# Patient Record
Sex: FEMALE | Race: WHITE | NOT HISPANIC OR LATINO | URBAN - METROPOLITAN AREA
[De-identification: names, ages, dates, MRNs, and addresses within clinical notes are randomized per-mention and may not be internally consistent; named-entity substitution may affect disease eponyms.]

---

## 2017-07-13 ENCOUNTER — INPATIENT (INPATIENT)
Facility: HOSPITAL | Age: 22
LOS: 1 days | Discharge: HOME | DRG: 770 | End: 2017-07-15
Attending: OBSTETRICS & GYNECOLOGY | Admitting: OBSTETRICS & GYNECOLOGY
Payer: COMMERCIAL

## 2017-07-13 VITALS
OXYGEN SATURATION: 100 % | RESPIRATION RATE: 18 BRPM | DIASTOLIC BLOOD PRESSURE: 83 MMHG | TEMPERATURE: 98 F | SYSTOLIC BLOOD PRESSURE: 118 MMHG | HEART RATE: 92 BPM

## 2017-07-13 LAB
ALBUMIN SERPL ELPH-MCNC: 4 G/DL — SIGNIFICANT CHANGE UP (ref 3.3–5)
ALP SERPL-CCNC: 34 U/L — LOW (ref 40–120)
ALT FLD-CCNC: 10 U/L RC — SIGNIFICANT CHANGE UP (ref 10–45)
ANION GAP SERPL CALC-SCNC: 13 MMOL/L — SIGNIFICANT CHANGE UP (ref 5–17)
AST SERPL-CCNC: 23 U/L — SIGNIFICANT CHANGE UP (ref 10–40)
BILIRUB SERPL-MCNC: 0.4 MG/DL — SIGNIFICANT CHANGE UP (ref 0.2–1.2)
BUN SERPL-MCNC: 10 MG/DL — SIGNIFICANT CHANGE UP (ref 7–23)
CALCIUM SERPL-MCNC: 8.5 MG/DL — SIGNIFICANT CHANGE UP (ref 8.4–10.5)
CHLORIDE SERPL-SCNC: 105 MMOL/L — SIGNIFICANT CHANGE UP (ref 96–108)
CO2 SERPL-SCNC: 20 MMOL/L — LOW (ref 22–31)
CREAT SERPL-MCNC: 0.51 MG/DL — SIGNIFICANT CHANGE UP (ref 0.5–1.3)
GLUCOSE SERPL-MCNC: 112 MG/DL — HIGH (ref 70–99)
HCG SERPL-ACNC: 2783 MIU/ML — SIGNIFICANT CHANGE UP
HCT VFR BLD CALC: 19.4 % — CRITICAL LOW (ref 34.5–45)
HGB BLD-MCNC: 6.6 G/DL — CRITICAL LOW (ref 11.5–15.5)
MCHC RBC-ENTMCNC: 30.6 PG — SIGNIFICANT CHANGE UP (ref 27–34)
MCHC RBC-ENTMCNC: 33.9 GM/DL — SIGNIFICANT CHANGE UP (ref 32–36)
MCV RBC AUTO: 90.4 FL — SIGNIFICANT CHANGE UP (ref 80–100)
PLATELET # BLD AUTO: 276 K/UL — SIGNIFICANT CHANGE UP (ref 150–400)
POTASSIUM SERPL-MCNC: 4.4 MMOL/L — SIGNIFICANT CHANGE UP (ref 3.5–5.3)
POTASSIUM SERPL-SCNC: 4.4 MMOL/L — SIGNIFICANT CHANGE UP (ref 3.5–5.3)
PROT SERPL-MCNC: 6.2 G/DL — SIGNIFICANT CHANGE UP (ref 6–8.3)
RBC # BLD: 2.14 M/UL — LOW (ref 3.8–5.2)
RBC # FLD: 11.6 % — SIGNIFICANT CHANGE UP (ref 10.3–14.5)
SODIUM SERPL-SCNC: 138 MMOL/L — SIGNIFICANT CHANGE UP (ref 135–145)
WBC # BLD: 7.1 K/UL — SIGNIFICANT CHANGE UP (ref 3.8–10.5)
WBC # FLD AUTO: 7.1 K/UL — SIGNIFICANT CHANGE UP (ref 3.8–10.5)

## 2017-07-13 PROCEDURE — 99285 EMERGENCY DEPT VISIT HI MDM: CPT | Mod: 25

## 2017-07-13 PROCEDURE — 93010 ELECTROCARDIOGRAM REPORT: CPT

## 2017-07-13 NOTE — ED ADULT NURSE NOTE - CHPI ED SYMPTOMS NEG
no dysuria/no vomiting/no burning urination/no chills/no abdominal distension/no blood in stool/no nausea/no hematuria/no fever/no diarrhea

## 2017-07-13 NOTE — ED PROVIDER NOTE - MEDICAL DECISION MAKING DETAILS
22F s/p elective TOP 4 weeks ago, presenting with increased vaginal bleeding and cramping; moderate bleeding with clots; plan to check cbc, cmp, type, hcg and obtain tv us to r/o retained poc.

## 2017-07-13 NOTE — ED ADULT NURSE NOTE - OBJECTIVE STATEMENT
Patient 23 yo F arrived ambulatory from triage. C/o of vaginal bleed. Pt reports having   an  procedure Elmer 17 and began having bleeding/ clots and low abd cramping. Patient denies Dizziness weakness at this time. No CP or SOB. No burning urination or  distress. No acute distress noted. IV placed. Labs drawn and sent. MD at bedside.

## 2017-07-13 NOTE — ED PROVIDER NOTE - OBJECTIVE STATEMENT
23 y/o female with pmhx of anxiety presents with c/o thick vaginal bleeding with clotting, weakness, and cramping. Pt had a termination surgery on June 17, and has had these symptoms since then. Denies fever, chills, vaginal discharge, or any other complaints.     OB/GYN  Dr. Garcia

## 2017-07-13 NOTE — ED PROVIDER NOTE - PROGRESS NOTE DETAILS
TV us shows signs of retained POC.  Hgb 6.6, transfusion with 2UPRBC ordered.  OBGYN consulted, to see patient.  -Braulio Awaiting OBGYN consultation, resident re-paged, 2 cases in OR, will come down as soon as possible; will continue to monitor. Malinda

## 2017-07-13 NOTE — ED ADULT TRIAGE NOTE - ARRIVAL INFO ADDITIONAL COMMENTS
Pt had   and has had continued cramping and ABD pain since.  Pt also having continued bleeding since procedure.

## 2017-07-14 ENCOUNTER — TRANSCRIPTION ENCOUNTER (OUTPATIENT)
Age: 22
End: 2017-07-14

## 2017-07-14 ENCOUNTER — RESULT REVIEW (OUTPATIENT)
Age: 22
End: 2017-07-14

## 2017-07-14 DIAGNOSIS — Z98.890 OTHER SPECIFIED POSTPROCEDURAL STATES: Chronic | ICD-10-CM

## 2017-07-14 DIAGNOSIS — O03.4 INCOMPLETE SPONTANEOUS ABORTION WITHOUT COMPLICATION: ICD-10-CM

## 2017-07-14 LAB
APPEARANCE UR: ABNORMAL
BASOPHILS # BLD AUTO: 0 K/UL — SIGNIFICANT CHANGE UP (ref 0–0.2)
BASOPHILS # BLD AUTO: 0.02 K/UL — SIGNIFICANT CHANGE UP (ref 0–0.2)
BASOPHILS NFR BLD AUTO: 0.3 % — SIGNIFICANT CHANGE UP (ref 0–2)
BASOPHILS NFR BLD AUTO: 0.4 % — SIGNIFICANT CHANGE UP (ref 0–2)
BILIRUB UR-MCNC: NEGATIVE — SIGNIFICANT CHANGE UP
BLD GP AB SCN SERPL QL: NEGATIVE — SIGNIFICANT CHANGE UP
COLOR SPEC: ABNORMAL
DIFF PNL FLD: ABNORMAL
EOSINOPHIL # BLD AUTO: 0 K/UL — SIGNIFICANT CHANGE UP (ref 0–0.5)
EOSINOPHIL # BLD AUTO: 0.03 K/UL — SIGNIFICANT CHANGE UP (ref 0–0.5)
EOSINOPHIL NFR BLD AUTO: 0.5 % — SIGNIFICANT CHANGE UP (ref 0–6)
EOSINOPHIL NFR BLD AUTO: 0.5 % — SIGNIFICANT CHANGE UP (ref 0–6)
GLUCOSE UR QL: 100
HCT VFR BLD CALC: 23.9 % — LOW (ref 34.5–45)
HGB BLD-MCNC: 7.9 G/DL — LOW (ref 11.5–15.5)
IMM GRANULOCYTES NFR BLD AUTO: 0.2 % — SIGNIFICANT CHANGE UP (ref 0–1.5)
KETONES UR-MCNC: ABNORMAL
LEUKOCYTE ESTERASE UR-ACNC: ABNORMAL
LYMPHOCYTES # BLD AUTO: 2.23 K/UL — SIGNIFICANT CHANGE UP (ref 1–3.3)
LYMPHOCYTES # BLD AUTO: 2.8 K/UL — SIGNIFICANT CHANGE UP (ref 1–3.3)
LYMPHOCYTES # BLD AUTO: 35.7 % — SIGNIFICANT CHANGE UP (ref 13–44)
LYMPHOCYTES # BLD AUTO: 39.1 % — SIGNIFICANT CHANGE UP (ref 13–44)
MCHC RBC-ENTMCNC: 27.7 PG — SIGNIFICANT CHANGE UP (ref 27–34)
MCHC RBC-ENTMCNC: 33.1 GM/DL — SIGNIFICANT CHANGE UP (ref 32–36)
MCV RBC AUTO: 83.9 FL — SIGNIFICANT CHANGE UP (ref 80–100)
MONOCYTES # BLD AUTO: 0.4 K/UL — SIGNIFICANT CHANGE UP (ref 0–0.9)
MONOCYTES # BLD AUTO: 0.42 K/UL — SIGNIFICANT CHANGE UP (ref 0–0.9)
MONOCYTES NFR BLD AUTO: 5.7 % — SIGNIFICANT CHANGE UP (ref 2–14)
MONOCYTES NFR BLD AUTO: 6.7 % — SIGNIFICANT CHANGE UP (ref 2–14)
NEUTROPHILS # BLD AUTO: 3.54 K/UL — SIGNIFICANT CHANGE UP (ref 1.8–7.4)
NEUTROPHILS # BLD AUTO: 3.9 K/UL — SIGNIFICANT CHANGE UP (ref 1.8–7.4)
NEUTROPHILS NFR BLD AUTO: 54.4 % — SIGNIFICANT CHANGE UP (ref 43–77)
NEUTROPHILS NFR BLD AUTO: 56.6 % — SIGNIFICANT CHANGE UP (ref 43–77)
NITRITE UR-MCNC: NEGATIVE — SIGNIFICANT CHANGE UP
PH UR: 8 — SIGNIFICANT CHANGE UP (ref 5–8)
PLAT MORPH BLD: NORMAL — SIGNIFICANT CHANGE UP
PLATELET # BLD AUTO: 243 K/UL — SIGNIFICANT CHANGE UP (ref 150–400)
PROT UR-MCNC: >600 MG/DL
RBC # BLD: 2.85 M/UL — LOW (ref 3.8–5.2)
RBC # FLD: 13.6 % — SIGNIFICANT CHANGE UP (ref 10.3–14.5)
RBC BLD AUTO: SIGNIFICANT CHANGE UP
RBC CASTS # UR COMP ASSIST: >50 /HPF (ref 0–2)
RH IG SCN BLD-IMP: POSITIVE — SIGNIFICANT CHANGE UP
SP GR SPEC: 1.03 — HIGH (ref 1.01–1.02)
UROBILINOGEN FLD QL: NEGATIVE — SIGNIFICANT CHANGE UP
WBC # BLD: 6.25 K/UL — SIGNIFICANT CHANGE UP (ref 3.8–10.5)
WBC # FLD AUTO: 6.25 K/UL — SIGNIFICANT CHANGE UP (ref 3.8–10.5)
WBC UR QL: ABNORMAL /HPF (ref 0–5)

## 2017-07-14 PROCEDURE — 76830 TRANSVAGINAL US NON-OB: CPT | Mod: 26

## 2017-07-14 PROCEDURE — 88305 TISSUE EXAM BY PATHOLOGIST: CPT | Mod: 26

## 2017-07-14 RX ORDER — SODIUM CHLORIDE 9 MG/ML
1000 INJECTION, SOLUTION INTRAVENOUS
Qty: 0 | Refills: 0 | Status: DISCONTINUED | OUTPATIENT
Start: 2017-07-14 | End: 2017-07-15

## 2017-07-14 RX ORDER — IBUPROFEN 200 MG
600 TABLET ORAL EVERY 6 HOURS
Qty: 0 | Refills: 0 | Status: DISCONTINUED | OUTPATIENT
Start: 2017-07-14 | End: 2017-07-15

## 2017-07-14 RX ORDER — CEFOTETAN DISODIUM 1 G
2 VIAL (EA) INJECTION ONCE
Qty: 0 | Refills: 0 | Status: COMPLETED | OUTPATIENT
Start: 2017-07-14 | End: 2017-07-14

## 2017-07-14 RX ORDER — HYDROMORPHONE HYDROCHLORIDE 2 MG/ML
0.5 INJECTION INTRAMUSCULAR; INTRAVENOUS; SUBCUTANEOUS
Qty: 0 | Refills: 0 | Status: DISCONTINUED | OUTPATIENT
Start: 2017-07-14 | End: 2017-07-14

## 2017-07-14 RX ORDER — ACETAMINOPHEN 500 MG
1000 TABLET ORAL ONCE
Qty: 0 | Refills: 0 | Status: DISCONTINUED | OUTPATIENT
Start: 2017-07-14 | End: 2017-07-15

## 2017-07-14 RX ORDER — ONDANSETRON 8 MG/1
8 TABLET, FILM COATED ORAL ONCE
Qty: 0 | Refills: 0 | Status: DISCONTINUED | OUTPATIENT
Start: 2017-07-14 | End: 2017-07-14

## 2017-07-14 RX ORDER — CLONAZEPAM 1 MG
0.5 TABLET ORAL DAILY
Qty: 0 | Refills: 0 | Status: DISCONTINUED | OUTPATIENT
Start: 2017-07-14 | End: 2017-07-15

## 2017-07-14 RX ORDER — OXYCODONE HYDROCHLORIDE 5 MG/1
5 TABLET ORAL EVERY 4 HOURS
Qty: 0 | Refills: 0 | Status: DISCONTINUED | OUTPATIENT
Start: 2017-07-14 | End: 2017-07-15

## 2017-07-14 RX ADMIN — HYDROMORPHONE HYDROCHLORIDE 0.5 MILLIGRAM(S): 2 INJECTION INTRAMUSCULAR; INTRAVENOUS; SUBCUTANEOUS at 19:20

## 2017-07-14 RX ADMIN — Medication 0.5 MILLIGRAM(S): at 21:11

## 2017-07-14 RX ADMIN — Medication 100 GRAM(S): at 04:09

## 2017-07-14 RX ADMIN — SODIUM CHLORIDE 125 MILLILITER(S): 9 INJECTION, SOLUTION INTRAVENOUS at 05:14

## 2017-07-14 RX ADMIN — Medication 110 MILLIGRAM(S): at 05:06

## 2017-07-14 RX ADMIN — HYDROMORPHONE HYDROCHLORIDE 0.5 MILLIGRAM(S): 2 INJECTION INTRAMUSCULAR; INTRAVENOUS; SUBCUTANEOUS at 19:30

## 2017-07-14 RX ADMIN — SODIUM CHLORIDE 125 MILLILITER(S): 9 INJECTION, SOLUTION INTRAVENOUS at 19:09

## 2017-07-14 NOTE — H&P ADULT - REASON FOR ADMISSION
Lightheadedness and vaginal bleeding/symptomatic anemia secondary to retained products of conception

## 2017-07-14 NOTE — H&P ADULT - PROBLEM SELECTOR PLAN 1
Neuro: c/w oral analgesia as needed  CV: hemodynamically stable  Pulm: saturating well on room air, encourage incentive spirometry and ambulation  GI: NPO  : voiding adequately.  LR@125.  No active vaginal bleeding. DVC for retained products of conception  Heme: Continue with 2 U PRBC transfusion.  Repeat CBC 4 hours after completion.  Ambulation and SCDs for DVT ppx    dw Dr Bustos

## 2017-07-14 NOTE — DISCHARGE NOTE ADULT - CARE PLAN
Principal Discharge DX:	Retained products of conception following   Goal:	postoperative wellness  Instructions for follow-up, activity and diet:	Advance diet and activity as tolerated. F/u GYN as scheduled. Call office with any concerns for complaints.

## 2017-07-14 NOTE — DISCHARGE NOTE ADULT - CARE PROVIDER_API CALL
Megan Bustos), Obstetrics and Gynecology  [address] [place] [address]  Phone: [phone]  Fax: [phone]

## 2017-07-14 NOTE — DISCHARGE NOTE ADULT - HOSPITAL COURSE
21 yo a/w symptomatic anemia found to have retained POC s/p TOP. Pt was given 2u pRBC before procedure. D&C performed on 7/14. Patient did well postoperatively. Tolerated regular diet and ivory d/c'ed. Patient was discharged on POD# ____ in stable condition. At time of discharge, ambulating, tolerating PO and voiding. Plan for f/u with GYN as outpatient. 21 yo a/w symptomatic anemia found to have retained POC s/p TOP. Pt was given 2u pRBC before procedure. D&C performed on 7/14. Patient did well postoperatively. Tolerated regular diet and ivory d/c'ed. Patient was discharged on POD# 1 (7/15/17) in stable condition. At time of discharge, ambulating, tolerating PO and voiding. Plan for f/u with GYN as outpatient.

## 2017-07-14 NOTE — H&P ADULT - NSHPPHYSICALEXAM_GEN_ALL_CORE
Constitutional: alert and oriented x 3, NAD    Respiratory: clear    Cardiovascular: regular rate and rhythm    Gastrointestinal: soft, non tender, + bowel sounds. No hepatosplenomegaly, no palpable masses    Genitourinary: NEFG  Cervix: closed/ long, no CMT, 5 cc blood in vault, no active bleeding  Uterus: normal size, non tender  Adnexa: non tender, no palpable masses

## 2017-07-14 NOTE — H&P ADULT - ATTENDING COMMENTS
Pt seen and examined. Agree with resident note. R / B / A of procedure  ( D and C for retained produts) disc with the patient. Pt expressed understanding and accepts.

## 2017-07-14 NOTE — H&P ADULT - HISTORY OF PRESENT ILLNESS
22 year old , LMP , presents with lightheadedness and SOB for one week in the context of heavy vaginal bleeding s/p ETOP with D+C on .  Patient underwent D+C for termination of pregnancy at an outside facility on 17.  Patient followed up there one week later with continued bleeding and was treated with Misprostol.  Since that time, patient has had heavy vaginal bleeding, intermittently passing clots.  For the past few days, patient has had lightheadedness and SOB.  Patient denies CP, palpitations, fevers, chills, nausea, vomiting, and changes in bowel habits.  Patient denies history of heavy bleeding.    In the ED, patient was hemodynamically stable and 2U PRBC transfusion was initiated for symptomatic anemia.    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

## 2017-07-14 NOTE — H&P ADULT - ASSESSMENT
22 year old , LMP , with symptomatic anemia secondary to vaginal bleeding from retained products of conception.  Patient is hemodynamically stable and currently without active vaginal bleeding.

## 2017-07-14 NOTE — DISCHARGE NOTE ADULT - PATIENT PORTAL LINK FT
“You can access the [facility] Patient Portal, offered by [facility], by registering with the following website: [email or web address]”

## 2017-07-14 NOTE — DISCHARGE NOTE ADULT - PLAN OF CARE
postoperative wellness Advance diet and activity as tolerated. F/u GYN as scheduled. Call office with any concerns for complaints.

## 2017-07-15 VITALS
OXYGEN SATURATION: 99 % | TEMPERATURE: 99 F | DIASTOLIC BLOOD PRESSURE: 69 MMHG | RESPIRATION RATE: 18 BRPM | HEART RATE: 62 BPM | SYSTOLIC BLOOD PRESSURE: 100 MMHG

## 2017-07-15 LAB
HCT VFR BLD CALC: 24.2 % — LOW (ref 34.5–45)
HGB BLD-MCNC: 8.6 G/DL — LOW (ref 11.5–15.5)
MCHC RBC-ENTMCNC: 31.2 PG — SIGNIFICANT CHANGE UP (ref 27–34)
MCHC RBC-ENTMCNC: 35.4 GM/DL — SIGNIFICANT CHANGE UP (ref 32–36)
MCV RBC AUTO: 88.3 FL — SIGNIFICANT CHANGE UP (ref 80–100)
PLATELET # BLD AUTO: 218 K/UL — SIGNIFICANT CHANGE UP (ref 150–400)
RBC # BLD: 2.74 M/UL — LOW (ref 3.8–5.2)
RBC # FLD: 12 % — SIGNIFICANT CHANGE UP (ref 10.3–14.5)
WBC # BLD: 6.2 K/UL — SIGNIFICANT CHANGE UP (ref 3.8–10.5)
WBC # FLD AUTO: 6.2 K/UL — SIGNIFICANT CHANGE UP (ref 3.8–10.5)

## 2017-07-15 PROCEDURE — 93005 ELECTROCARDIOGRAM TRACING: CPT

## 2017-07-15 PROCEDURE — 99285 EMERGENCY DEPT VISIT HI MDM: CPT | Mod: 25

## 2017-07-15 PROCEDURE — 88305 TISSUE EXAM BY PATHOLOGIST: CPT

## 2017-07-15 PROCEDURE — 86900 BLOOD TYPING SEROLOGIC ABO: CPT

## 2017-07-15 PROCEDURE — 81001 URINALYSIS AUTO W/SCOPE: CPT

## 2017-07-15 PROCEDURE — 84702 CHORIONIC GONADOTROPIN TEST: CPT

## 2017-07-15 PROCEDURE — 86923 COMPATIBILITY TEST ELECTRIC: CPT

## 2017-07-15 PROCEDURE — 85027 COMPLETE CBC AUTOMATED: CPT

## 2017-07-15 PROCEDURE — 36430 TRANSFUSION BLD/BLD COMPNT: CPT

## 2017-07-15 PROCEDURE — P9016: CPT

## 2017-07-15 PROCEDURE — 76830 TRANSVAGINAL US NON-OB: CPT

## 2017-07-15 PROCEDURE — 86850 RBC ANTIBODY SCREEN: CPT

## 2017-07-15 PROCEDURE — P9040: CPT

## 2017-07-15 PROCEDURE — 86901 BLOOD TYPING SEROLOGIC RH(D): CPT

## 2017-07-15 PROCEDURE — 80053 COMPREHEN METABOLIC PANEL: CPT

## 2017-07-15 RX ORDER — IBUPROFEN 200 MG
1 TABLET ORAL
Qty: 0 | Refills: 0 | COMMUNITY
Start: 2017-07-15

## 2017-07-15 NOTE — PROGRESS NOTE ADULT - SUBJECTIVE AND OBJECTIVE BOX
POD#1   HD#2    Patient seen and examined at bedside, no acute overnight events. No acute complaints, pain well controlled.  Patient is ambulating and tolerating clears. Has passed flatus. Denies CP, SOB, N/V, fevers, and chills. Patient had light vaginal bleeding after surgery but reports no bleeding or abdominal pain at this time.    Vital Signs Last 24 Hours  T(C): 36.7 (07-15-17 @ 05:57), Max: 37.2 (07-14-17 @ 19:30)  HR: 71 (07-15-17 @ 05:57) (60 - 91)  BP: 96/60 (07-15-17 @ 05:57) (93/57 - 110/62)  RR: 18 (07-15-17 @ 05:57) (14 - 22)  SpO2: 99% (07-15-17 @ 05:57) (98% - 100%)    I&O's Summary    14 Jul 2017 07:01  -  15 Jul 2017 07:00  --------------------------------------------------------  IN: 3110 mL / OUT: 2000 mL / NET: 1110 mL        Physical Exam:  General: NAD  CV: RRR  Lungs: CTAB  Abdomen: Soft, non-tender, non-distended, +BS  Incision: _ CDI  Ext: No pain or swelling    Labs:                        8.6    6.2   )-----------( 218      ( 15 Jul 2017 06:56 )             24.2   baso x      eos x      imm gran x      lymph x      mono x      poly x                            7.9    6.25  )-----------( 243      ( 14 Jul 2017 13:40 )             23.9   baso 0.3    eos 0.5    imm gran 0.2    lymph 35.7   mono 6.7    poly 56.6                         6.6    7.1   )-----------( 276      ( 13 Jul 2017 22:52 )             19.4   baso 0.4    eos 0.5    imm gran x      lymph 39.1   mono 5.7    poly 54.4       MEDICATIONS  (STANDING):  acetaminophen  IVPB. 1000 milliGRAM(s) IV Intermittent once  misoprostol 1000 MICROGram(s) Vaginal once  clonazePAM Tablet 0.5 milliGRAM(s) Oral daily    MEDICATIONS  (PRN):  ibuprofen  Tablet 600 milliGRAM(s) Oral every 6 hours PRN moderate pain  oxyCODONE    IR 5 milliGRAM(s) Oral every 4 hours PRN Severe Pain (7 - 10)

## 2017-07-15 NOTE — PROGRESS NOTE ADULT - ASSESSMENT
A/P:  a/w symptomatic anemia secondary to retained products of conception s/p TOP, POD 1 s/p D&C  A/P:  a/w symptomatic anemia secondary to retained products of conception s/p TOP, POD 1 s/p D&C , CBC stable post op

## 2017-07-15 NOTE — PROGRESS NOTE ADULT - PROBLEM SELECTOR PLAN 1
Neuro: oral pain meds.  CV: Hemodynamically stable.  Pulm: Saturating well on room air. Encourage incentive spirometry.  GI: Advance to regular diet.  : UOP adequate.  Heme: early ambulation      Brittney Hamm MD Neuro: oral pain meds.  CV: Hemodynamically stable.  Pulm: Saturating well on room air. Encourage incentive spirometry.  GI: Advance to regular diet.  : UOP adequate.  Heme: early ambulation  D/C home today      Brittney Hamm MD

## 2017-07-15 NOTE — PROGRESS NOTE ADULT - SUBJECTIVE AND OBJECTIVE BOX
Ob/Gyn Attg Note    Subjective:  The patient feels well.  She is ambulating.   She is tolerating regular diet.  She denies nausea and vomiting.  She is voiding.  Does not have any sig pain.  Has only slight vag blood staining.    Physical exam:    Vital Signs Last 24 Hrs  T(C): 37.1 (15 Jul 2017 09:11), Max: 37.2 (14 Jul 2017 19:30)  T(F): 98.7 (15 Jul 2017 09:11), Max: 99 (14 Jul 2017 19:30)  HR: 62 (15 Jul 2017 09:11) (60 - 91)  BP: 100/69 (15 Jul 2017 09:11) (93/57 - 110/62)  BP(mean): 77 (14 Jul 2017 19:30) (74 - 79)  RR: 18 (15 Jul 2017 09:11) (14 - 22)  SpO2: 99% (15 Jul 2017 09:11) (98% - 100%)    Gen: NAD  Abdomen: Soft, nontender, no distension, no peritoneal signs  Perineum: slight vag bleeding  Ext: No calf tenderness, non edematous    LABS:                        8.6    6.2   )-----------( 218      ( 15 Jul 2017 06:56 )             24.2                         7.9    6.25  )-----------( 243      ( 14 Jul 2017 13:40 )             23.9                         6.6    7.1   )-----------( 276      ( 13 Jul 2017 22:52 )             19.4       07-13-17 @ 22:52      138  |  105  |  10  ----------------------------<  112<H>  4.4   |  20<L>  |  0.51        Ca    8.5      13 Jul 2017 22:52    TPro  6.2  /  Alb  4.0  /  TBili  0.4  /  DBili  x   /  AST  23  /  ALT  10  /  AlkPhos  34<L>  07-13-17 @ 22:52          Assessment and Plan  s/p DVC for incom ab / retained poc's.   anemic, but hemodynamically stable and well.  OK to d/c home.  f/u instructions given  advised po Doxy 100mg po bid x 5 days after discharge.

## 2017-07-18 LAB — SURGICAL PATHOLOGY STUDY: SIGNIFICANT CHANGE UP

## 2018-01-18 ENCOUNTER — ASOB RESULT (OUTPATIENT)
Age: 23
End: 2018-01-18

## 2018-01-18 ENCOUNTER — APPOINTMENT (OUTPATIENT)
Dept: ANTEPARTUM | Facility: CLINIC | Age: 23
End: 2018-01-18
Payer: MEDICARE

## 2018-01-18 PROBLEM — F41.9 ANXIETY DISORDER, UNSPECIFIED: Chronic | Status: ACTIVE | Noted: 2017-07-13

## 2018-01-18 PROCEDURE — 76801 OB US < 14 WKS SINGLE FETUS: CPT

## 2018-01-18 PROCEDURE — 36416 COLLJ CAPILLARY BLOOD SPEC: CPT

## 2018-01-18 PROCEDURE — 76813 OB US NUCHAL MEAS 1 GEST: CPT

## 2018-03-20 ENCOUNTER — ASOB RESULT (OUTPATIENT)
Age: 23
End: 2018-03-20

## 2018-03-20 ENCOUNTER — APPOINTMENT (OUTPATIENT)
Dept: ANTEPARTUM | Facility: CLINIC | Age: 23
End: 2018-03-20
Payer: MEDICARE

## 2018-03-20 PROCEDURE — 76817 TRANSVAGINAL US OBSTETRIC: CPT | Mod: 59

## 2018-03-20 PROCEDURE — 76811 OB US DETAILED SNGL FETUS: CPT

## 2018-06-12 ENCOUNTER — APPOINTMENT (OUTPATIENT)
Dept: ANTEPARTUM | Facility: CLINIC | Age: 23
End: 2018-06-12
Payer: COMMERCIAL

## 2018-06-12 ENCOUNTER — ASOB RESULT (OUTPATIENT)
Age: 23
End: 2018-06-12

## 2018-06-12 PROCEDURE — 76816 OB US FOLLOW-UP PER FETUS: CPT

## 2018-07-09 ENCOUNTER — APPOINTMENT (OUTPATIENT)
Dept: ANTEPARTUM | Facility: CLINIC | Age: 23
End: 2018-07-09
Payer: COMMERCIAL

## 2018-07-09 ENCOUNTER — ASOB RESULT (OUTPATIENT)
Age: 23
End: 2018-07-09

## 2018-07-09 PROCEDURE — 76816 OB US FOLLOW-UP PER FETUS: CPT

## 2018-07-27 ENCOUNTER — INPATIENT (INPATIENT)
Facility: HOSPITAL | Age: 23
LOS: 1 days | Discharge: HOME | End: 2018-07-29
Attending: OBSTETRICS & GYNECOLOGY | Admitting: OBSTETRICS & GYNECOLOGY
Payer: COMMERCIAL

## 2018-07-27 VITALS — WEIGHT: 149.91 LBS | HEIGHT: 63 IN

## 2018-07-27 DIAGNOSIS — Z3A.00 WEEKS OF GESTATION OF PREGNANCY NOT SPECIFIED: ICD-10-CM

## 2018-07-27 DIAGNOSIS — Z34.80 ENCOUNTER FOR SUPERVISION OF OTHER NORMAL PREGNANCY, UNSPECIFIED TRIMESTER: ICD-10-CM

## 2018-07-27 DIAGNOSIS — Z98.890 OTHER SPECIFIED POSTPROCEDURAL STATES: Chronic | ICD-10-CM

## 2018-07-27 DIAGNOSIS — O26.899 OTHER SPECIFIED PREGNANCY RELATED CONDITIONS, UNSPECIFIED TRIMESTER: ICD-10-CM

## 2018-07-27 PROBLEM — B00.9 HERPESVIRAL INFECTION, UNSPECIFIED: Chronic | Status: ACTIVE | Noted: 2017-07-14

## 2018-07-27 LAB
BASOPHILS # BLD AUTO: 0 K/UL — SIGNIFICANT CHANGE UP (ref 0–0.2)
BASOPHILS NFR BLD AUTO: 0.1 % — SIGNIFICANT CHANGE UP (ref 0–2)
BLD GP AB SCN SERPL QL: NEGATIVE — SIGNIFICANT CHANGE UP
EOSINOPHIL # BLD AUTO: 0.1 K/UL — SIGNIFICANT CHANGE UP (ref 0–0.5)
EOSINOPHIL NFR BLD AUTO: 0.6 % — SIGNIFICANT CHANGE UP (ref 0–6)
HCT VFR BLD CALC: 35.1 % — SIGNIFICANT CHANGE UP (ref 34.5–45)
HGB BLD-MCNC: 11.9 G/DL — SIGNIFICANT CHANGE UP (ref 11.5–15.5)
LYMPHOCYTES # BLD AUTO: 1.8 K/UL — SIGNIFICANT CHANGE UP (ref 1–3.3)
LYMPHOCYTES # BLD AUTO: 16.3 % — SIGNIFICANT CHANGE UP (ref 13–44)
MCHC RBC-ENTMCNC: 30.7 PG — SIGNIFICANT CHANGE UP (ref 27–34)
MCHC RBC-ENTMCNC: 33.9 GM/DL — SIGNIFICANT CHANGE UP (ref 32–36)
MCV RBC AUTO: 90.6 FL — SIGNIFICANT CHANGE UP (ref 80–100)
MONOCYTES # BLD AUTO: 0.6 K/UL — SIGNIFICANT CHANGE UP (ref 0–0.9)
MONOCYTES NFR BLD AUTO: 5.2 % — SIGNIFICANT CHANGE UP (ref 2–14)
NEUTROPHILS # BLD AUTO: 8.4 K/UL — HIGH (ref 1.8–7.4)
NEUTROPHILS NFR BLD AUTO: 77.8 % — HIGH (ref 43–77)
PLATELET # BLD AUTO: 186 K/UL — SIGNIFICANT CHANGE UP (ref 150–400)
RBC # BLD: 3.87 M/UL — SIGNIFICANT CHANGE UP (ref 3.8–5.2)
RBC # FLD: 12.5 % — SIGNIFICANT CHANGE UP (ref 10.3–14.5)
RH IG SCN BLD-IMP: POSITIVE — SIGNIFICANT CHANGE UP
T PALLIDUM AB TITR SER: NEGATIVE — SIGNIFICANT CHANGE UP
WBC # BLD: 10.8 K/UL — HIGH (ref 3.8–10.5)
WBC # FLD AUTO: 10.8 K/UL — HIGH (ref 3.8–10.5)

## 2018-07-27 RX ORDER — OXYCODONE HYDROCHLORIDE 5 MG/1
5 TABLET ORAL
Qty: 0 | Refills: 0 | Status: DISCONTINUED | OUTPATIENT
Start: 2018-07-27 | End: 2018-07-29

## 2018-07-27 RX ORDER — OXYTOCIN 10 UNIT/ML
333.33 VIAL (ML) INJECTION
Qty: 20 | Refills: 0 | Status: DISCONTINUED | OUTPATIENT
Start: 2018-07-27 | End: 2018-07-27

## 2018-07-27 RX ORDER — GLYCERIN ADULT
1 SUPPOSITORY, RECTAL RECTAL AT BEDTIME
Qty: 0 | Refills: 0 | Status: DISCONTINUED | OUTPATIENT
Start: 2018-07-27 | End: 2018-07-29

## 2018-07-27 RX ORDER — AER TRAVELER 0.5 G/1
1 SOLUTION RECTAL; TOPICAL EVERY 4 HOURS
Qty: 0 | Refills: 0 | Status: DISCONTINUED | OUTPATIENT
Start: 2018-07-27 | End: 2018-07-27

## 2018-07-27 RX ORDER — MAGNESIUM HYDROXIDE 400 MG/1
30 TABLET, CHEWABLE ORAL
Qty: 0 | Refills: 0 | Status: DISCONTINUED | OUTPATIENT
Start: 2018-07-27 | End: 2018-07-29

## 2018-07-27 RX ORDER — PRAMOXINE HYDROCHLORIDE 150 MG/15G
1 AEROSOL, FOAM RECTAL EVERY 4 HOURS
Qty: 0 | Refills: 0 | Status: DISCONTINUED | OUTPATIENT
Start: 2018-07-27 | End: 2018-07-29

## 2018-07-27 RX ORDER — IBUPROFEN 200 MG
600 TABLET ORAL EVERY 6 HOURS
Qty: 0 | Refills: 0 | Status: DISCONTINUED | OUTPATIENT
Start: 2018-07-27 | End: 2018-07-29

## 2018-07-27 RX ORDER — HYDROCORTISONE 1 %
1 OINTMENT (GRAM) TOPICAL EVERY 4 HOURS
Qty: 0 | Refills: 0 | Status: DISCONTINUED | OUTPATIENT
Start: 2018-07-27 | End: 2018-07-29

## 2018-07-27 RX ORDER — OXYTOCIN 10 UNIT/ML
41.67 VIAL (ML) INJECTION
Qty: 20 | Refills: 0 | Status: DISCONTINUED | OUTPATIENT
Start: 2018-07-27 | End: 2018-07-29

## 2018-07-27 RX ORDER — ACETAMINOPHEN 500 MG
975 TABLET ORAL EVERY 6 HOURS
Qty: 0 | Refills: 0 | Status: COMPLETED | OUTPATIENT
Start: 2018-07-27 | End: 2019-06-25

## 2018-07-27 RX ORDER — OXYCODONE HYDROCHLORIDE 5 MG/1
5 TABLET ORAL EVERY 4 HOURS
Qty: 0 | Refills: 0 | Status: DISCONTINUED | OUTPATIENT
Start: 2018-07-27 | End: 2018-07-29

## 2018-07-27 RX ORDER — ACETAMINOPHEN 500 MG
975 TABLET ORAL EVERY 6 HOURS
Qty: 0 | Refills: 0 | Status: DISCONTINUED | OUTPATIENT
Start: 2018-07-27 | End: 2018-07-29

## 2018-07-27 RX ORDER — IBUPROFEN 200 MG
600 TABLET ORAL EVERY 6 HOURS
Qty: 0 | Refills: 0 | Status: COMPLETED | OUTPATIENT
Start: 2018-07-27 | End: 2019-06-25

## 2018-07-27 RX ORDER — SIMETHICONE 80 MG/1
80 TABLET, CHEWABLE ORAL EVERY 6 HOURS
Qty: 0 | Refills: 0 | Status: DISCONTINUED | OUTPATIENT
Start: 2018-07-27 | End: 2018-07-29

## 2018-07-27 RX ORDER — CITRIC ACID/SODIUM CITRATE 300-500 MG
15 SOLUTION, ORAL ORAL EVERY 4 HOURS
Qty: 0 | Refills: 0 | Status: DISCONTINUED | OUTPATIENT
Start: 2018-07-27 | End: 2018-07-27

## 2018-07-27 RX ORDER — HYDROCORTISONE 1 %
1 OINTMENT (GRAM) TOPICAL EVERY 4 HOURS
Qty: 0 | Refills: 0 | Status: DISCONTINUED | OUTPATIENT
Start: 2018-07-27 | End: 2018-07-27

## 2018-07-27 RX ORDER — AMPICILLIN TRIHYDRATE 250 MG
CAPSULE ORAL
Qty: 0 | Refills: 0 | Status: DISCONTINUED | OUTPATIENT
Start: 2018-07-27 | End: 2018-07-29

## 2018-07-27 RX ORDER — SODIUM CHLORIDE 9 MG/ML
3 INJECTION INTRAMUSCULAR; INTRAVENOUS; SUBCUTANEOUS EVERY 8 HOURS
Qty: 0 | Refills: 0 | Status: DISCONTINUED | OUTPATIENT
Start: 2018-07-27 | End: 2018-07-27

## 2018-07-27 RX ORDER — TETANUS TOXOID, REDUCED DIPHTHERIA TOXOID AND ACELLULAR PERTUSSIS VACCINE, ADSORBED 5; 2.5; 8; 8; 2.5 [IU]/.5ML; [IU]/.5ML; UG/.5ML; UG/.5ML; UG/.5ML
0.5 SUSPENSION INTRAMUSCULAR ONCE
Qty: 0 | Refills: 0 | Status: DISCONTINUED | OUTPATIENT
Start: 2018-07-27 | End: 2018-07-29

## 2018-07-27 RX ORDER — DOCUSATE SODIUM 100 MG
100 CAPSULE ORAL
Qty: 0 | Refills: 0 | Status: DISCONTINUED | OUTPATIENT
Start: 2018-07-27 | End: 2018-07-29

## 2018-07-27 RX ORDER — SODIUM CHLORIDE 9 MG/ML
1000 INJECTION, SOLUTION INTRAVENOUS ONCE
Qty: 0 | Refills: 0 | Status: COMPLETED | OUTPATIENT
Start: 2018-07-27 | End: 2018-07-27

## 2018-07-27 RX ORDER — DIPHENHYDRAMINE HCL 50 MG
25 CAPSULE ORAL EVERY 6 HOURS
Qty: 0 | Refills: 0 | Status: DISCONTINUED | OUTPATIENT
Start: 2018-07-27 | End: 2018-07-29

## 2018-07-27 RX ORDER — AMPICILLIN TRIHYDRATE 250 MG
1 CAPSULE ORAL EVERY 4 HOURS
Qty: 0 | Refills: 0 | Status: DISCONTINUED | OUTPATIENT
Start: 2018-07-27 | End: 2018-07-29

## 2018-07-27 RX ORDER — PRAMOXINE HYDROCHLORIDE 150 MG/15G
1 AEROSOL, FOAM RECTAL EVERY 4 HOURS
Qty: 0 | Refills: 0 | Status: DISCONTINUED | OUTPATIENT
Start: 2018-07-27 | End: 2018-07-27

## 2018-07-27 RX ORDER — DIBUCAINE 1 %
1 OINTMENT (GRAM) RECTAL EVERY 4 HOURS
Qty: 0 | Refills: 0 | Status: DISCONTINUED | OUTPATIENT
Start: 2018-07-27 | End: 2018-07-27

## 2018-07-27 RX ORDER — SODIUM CHLORIDE 9 MG/ML
3 INJECTION INTRAMUSCULAR; INTRAVENOUS; SUBCUTANEOUS EVERY 8 HOURS
Qty: 0 | Refills: 0 | Status: DISCONTINUED | OUTPATIENT
Start: 2018-07-27 | End: 2018-07-29

## 2018-07-27 RX ORDER — AMPICILLIN TRIHYDRATE 250 MG
2 CAPSULE ORAL ONCE
Qty: 0 | Refills: 0 | Status: COMPLETED | OUTPATIENT
Start: 2018-07-27 | End: 2018-07-27

## 2018-07-27 RX ORDER — SODIUM CHLORIDE 9 MG/ML
1000 INJECTION, SOLUTION INTRAVENOUS
Qty: 0 | Refills: 0 | Status: DISCONTINUED | OUTPATIENT
Start: 2018-07-27 | End: 2018-07-27

## 2018-07-27 RX ORDER — KETOROLAC TROMETHAMINE 30 MG/ML
30 SYRINGE (ML) INJECTION ONCE
Qty: 0 | Refills: 0 | Status: DISCONTINUED | OUTPATIENT
Start: 2018-07-27 | End: 2018-07-27

## 2018-07-27 RX ORDER — CLONAZEPAM 1 MG
0.25 TABLET ORAL ONCE
Qty: 0 | Refills: 0 | Status: DISCONTINUED | OUTPATIENT
Start: 2018-07-27 | End: 2018-07-27

## 2018-07-27 RX ORDER — DIBUCAINE 1 %
1 OINTMENT (GRAM) RECTAL EVERY 4 HOURS
Qty: 0 | Refills: 0 | Status: DISCONTINUED | OUTPATIENT
Start: 2018-07-27 | End: 2018-07-29

## 2018-07-27 RX ORDER — OXYTOCIN 10 UNIT/ML
41.67 VIAL (ML) INJECTION
Qty: 20 | Refills: 0 | Status: DISCONTINUED | OUTPATIENT
Start: 2018-07-27 | End: 2018-07-27

## 2018-07-27 RX ORDER — LANOLIN
1 OINTMENT (GRAM) TOPICAL EVERY 6 HOURS
Qty: 0 | Refills: 0 | Status: DISCONTINUED | OUTPATIENT
Start: 2018-07-27 | End: 2018-07-29

## 2018-07-27 RX ORDER — AER TRAVELER 0.5 G/1
1 SOLUTION RECTAL; TOPICAL EVERY 4 HOURS
Qty: 0 | Refills: 0 | Status: DISCONTINUED | OUTPATIENT
Start: 2018-07-27 | End: 2018-07-29

## 2018-07-27 RX ADMIN — Medication 30 MILLIGRAM(S): at 19:25

## 2018-07-27 RX ADMIN — Medication 975 MILLIGRAM(S): at 22:10

## 2018-07-27 RX ADMIN — Medication 0.25 MILLIGRAM(S): at 15:45

## 2018-07-27 RX ADMIN — SODIUM CHLORIDE 3 MILLILITER(S): 9 INJECTION INTRAMUSCULAR; INTRAVENOUS; SUBCUTANEOUS at 21:54

## 2018-07-27 RX ADMIN — Medication 116 GRAM(S): at 13:45

## 2018-07-27 RX ADMIN — SODIUM CHLORIDE 125 MILLILITER(S): 9 INJECTION, SOLUTION INTRAVENOUS at 15:06

## 2018-07-27 RX ADMIN — SODIUM CHLORIDE 2000 MILLILITER(S): 9 INJECTION, SOLUTION INTRAVENOUS at 13:10

## 2018-07-27 RX ADMIN — Medication 975 MILLIGRAM(S): at 21:54

## 2018-07-27 RX ADMIN — Medication 125 MILLIUNIT(S)/MIN: at 18:53

## 2018-07-28 DIAGNOSIS — B00.9 HERPESVIRAL INFECTION, UNSPECIFIED: ICD-10-CM

## 2018-07-28 DIAGNOSIS — F41.9 ANXIETY DISORDER, UNSPECIFIED: ICD-10-CM

## 2018-07-28 LAB
HCT VFR BLD CALC: 33.1 % — LOW (ref 34.5–45)
HGB BLD-MCNC: 11.2 G/DL — LOW (ref 11.5–15.5)

## 2018-07-28 RX ADMIN — OXYCODONE HYDROCHLORIDE 5 MILLIGRAM(S): 5 TABLET ORAL at 18:38

## 2018-07-28 RX ADMIN — OXYCODONE HYDROCHLORIDE 5 MILLIGRAM(S): 5 TABLET ORAL at 22:45

## 2018-07-28 RX ADMIN — Medication 600 MILLIGRAM(S): at 07:50

## 2018-07-28 RX ADMIN — OXYCODONE HYDROCHLORIDE 5 MILLIGRAM(S): 5 TABLET ORAL at 18:06

## 2018-07-28 RX ADMIN — OXYCODONE HYDROCHLORIDE 5 MILLIGRAM(S): 5 TABLET ORAL at 23:15

## 2018-07-28 RX ADMIN — Medication 975 MILLIGRAM(S): at 07:49

## 2018-07-28 RX ADMIN — Medication 600 MILLIGRAM(S): at 06:06

## 2018-07-28 RX ADMIN — Medication 100 MILLIGRAM(S): at 16:38

## 2018-07-28 RX ADMIN — Medication 975 MILLIGRAM(S): at 06:06

## 2018-07-28 NOTE — PROGRESS NOTE ADULT - SUBJECTIVE AND OBJECTIVE BOX
PA PPD#1  Note    Blood Type:    O+              Rubella:   Immune              RPR:  NR  Pain:  Controlled  Complaints:  None  Pt. is OOB, voiding, passing flatus, & tolerating regular diet.  Lochia:  WNL  Formula Feeding    VS:  Vital Signs Last 24 Hrs  T(C): 36.3 (2018 05:00), Max: 36.9 (2018 17:35)  T(F): 97.4 (2018 05:00), Max: 98.5 (2018 21:37)  HR: 83 (2018 05:00) (67 - 700)  BP: 102/63 (2018 05:00) (102/63 - 133/88)  RR: 18 (2018 05:00) (15 - 18)  SpO2: 95% (2018 01:02) (95% - 99%)                    11.2   x     )-----------( x        ( 2018 05:59 )             33.1     Abd:  Soft, non-distended, non-tender            Fundus-firm  Laceration-1st Degree: C/D/I  Extremities:  Edema - none                     Calf Tenderness - none    Assessment:    23y y.o. G 3 P 1011      PPD # S/P  in stable condition.  PMHx significant for:  Anxiety, HSV2 (no outbreaks, maintained on prophylactic Valtrex prior to delivery)  Current Issues:  None  Plan:  Increase OOB             Cont. Tylenol, Motrin, Oxycodone             H&H  as above             Cont. Reg. Diet             Cont. PP Lisa Finch PA-C

## 2018-07-28 NOTE — PROGRESS NOTE ADULT - SUBJECTIVE AND OBJECTIVE BOX
Post-Anesthetic Evaluation:    The Patient was interviewed and evaluated    Vital Signs Last 24 Hrs  T(C): 36.7 (28 Jul 2018 09:00), Max: 36.9 (27 Jul 2018 17:35)  T(F): 98.1 (28 Jul 2018 09:00), Max: 98.5 (27 Jul 2018 21:37)  HR: 75 (28 Jul 2018 09:00) (67 - 700)  BP: 106/74 (28 Jul 2018 09:00) (102/63 - 133/88)  BP(mean): --  RR: 16 (28 Jul 2018 09:00) (15 - 18)  SpO2: 95% (28 Jul 2018 01:02) (95% - 99%)    Evaluation:      (X) No apparent complications or complaints regarding anesthesia care at this time  (X) All questions were answered    Condition:  (X) Stable      ( ) Guarded      ( ) Critical    Recommendations:  (X) None     ( ) Other:

## 2018-07-29 ENCOUNTER — TRANSCRIPTION ENCOUNTER (OUTPATIENT)
Age: 23
End: 2018-07-29

## 2018-07-29 VITALS
HEART RATE: 62 BPM | SYSTOLIC BLOOD PRESSURE: 113 MMHG | DIASTOLIC BLOOD PRESSURE: 74 MMHG | TEMPERATURE: 98 F | RESPIRATION RATE: 18 BRPM

## 2018-07-29 PROCEDURE — 86850 RBC ANTIBODY SCREEN: CPT

## 2018-07-29 PROCEDURE — 86900 BLOOD TYPING SEROLOGIC ABO: CPT

## 2018-07-29 PROCEDURE — G0463: CPT

## 2018-07-29 PROCEDURE — 59025 FETAL NON-STRESS TEST: CPT

## 2018-07-29 PROCEDURE — 85014 HEMATOCRIT: CPT

## 2018-07-29 PROCEDURE — 59050 FETAL MONITOR W/REPORT: CPT

## 2018-07-29 PROCEDURE — 86780 TREPONEMA PALLIDUM: CPT

## 2018-07-29 PROCEDURE — 85018 HEMOGLOBIN: CPT

## 2018-07-29 PROCEDURE — 86901 BLOOD TYPING SEROLOGIC RH(D): CPT

## 2018-07-29 PROCEDURE — 85027 COMPLETE CBC AUTOMATED: CPT

## 2018-07-29 RX ORDER — IBUPROFEN 200 MG
1 TABLET ORAL
Qty: 0 | Refills: 0 | COMMUNITY
Start: 2018-07-29

## 2018-07-29 RX ORDER — ALPRAZOLAM 0.25 MG
1 TABLET ORAL
Qty: 0 | Refills: 0 | COMMUNITY

## 2018-07-29 RX ORDER — BENZOYL PEROXIDE MICRONIZED 5.8 %
0 TOWELETTE (EA) TOPICAL
Qty: 0 | Refills: 0 | COMMUNITY

## 2018-07-29 RX ORDER — CLONAZEPAM 1 MG
1 TABLET ORAL
Qty: 0 | Refills: 0 | COMMUNITY

## 2018-07-29 RX ORDER — ACETAMINOPHEN 500 MG
3 TABLET ORAL
Qty: 0 | Refills: 0 | DISCHARGE
Start: 2018-07-29

## 2018-07-29 RX ADMIN — OXYCODONE HYDROCHLORIDE 5 MILLIGRAM(S): 5 TABLET ORAL at 08:51

## 2018-07-29 NOTE — DISCHARGE NOTE OB - CARE PLAN
Principal Discharge DX:	Vaginal delivery  Goal:	baseline function  Assessment and plan of treatment:	regular diet, activity as tolerated, follow up in office

## 2018-07-29 NOTE — DISCHARGE NOTE OB - CARE PROVIDER_API CALL
Megan Bustos), Obstetrics and Gynecology  [address] [place] [address]  Phone: [phone]  Fax: [phone]

## 2018-07-29 NOTE — DISCHARGE NOTE OB - PATIENT PORTAL LINK FT
You can access the [facility] Patient Portal, offered by [facility], by registering with the following website: [email or web address]

## 2019-10-22 ENCOUNTER — TRANSCRIPTION ENCOUNTER (OUTPATIENT)
Age: 24
End: 2019-10-22

## 2019-10-22 ENCOUNTER — OUTPATIENT (OUTPATIENT)
Dept: OUTPATIENT SERVICES | Facility: HOSPITAL | Age: 24
LOS: 1 days | Discharge: HOME | End: 2019-10-22

## 2019-10-22 DIAGNOSIS — Z98.890 OTHER SPECIFIED POSTPROCEDURAL STATES: Chronic | ICD-10-CM

## 2019-10-23 DIAGNOSIS — F10.20 ALCOHOL DEPENDENCE, UNCOMPLICATED: ICD-10-CM

## 2020-06-10 NOTE — DISCHARGE NOTE OB - NS DC ANGIO PCI YN
no W Plasty Text: The lesion was extirpated to the level of the fat with a #15c scalpel blade.  Given the location of the defect, shape of the defect and the proximity to free margins a W-plasty was deemed most appropriate for repair.  Using a sterile surgical marker, the appropriate transposition arms of the W-plasty were drawn incorporating the defect and placing the expected incisions within the relaxed skin tension lines where possible.    The area thus outlined was incised deep to adipose tissue with a #15 scalpel blade.  The skin margins were undermined to an appropriate distance in all directions utilizing iris scissors.  The opposing transposition arms were then transposed into place in opposite direction and anchored with interrupted buried subcutaneous sutures.

## 2020-06-30 NOTE — PATIENT PROFILE ADULT. - TEACHING/LEARNING FACTORS INFLUENCE READINESS TO LEARN
to be 8 hours apart as long as no more than three doses in a day)  Qty: 90 tablet, Refills: 0      VORTIoxetine (TRINTELLIX) 10 MG TABS tablet Take 1 tablet by mouth daily  Qty: 30 tablet, Refills: 0      brexpiprazole (REXULTI) 1 MG TABS tablet Take 1 tablet by mouth daily  Qty: 30 tablet, Refills: 0         CONTINUE these medications which have CHANGED    Details   traZODone (DESYREL) 50 MG tablet Take 1 tablet by mouth nightly as needed for Sleep  Qty: 30 tablet, Refills: 0         STOP taking these medications       amphetamine-dextroamphetamine (ADDERALL) 30 MG tablet Comments:   Reason for Stopping:         QUEtiapine (SEROQUEL) 25 MG tablet Comments:   Reason for Stopping:         Methylphenidate HCl (CONCERTA PO) Comments:   Reason for Stopping:                  Core Measures statement:   Not applicable    Discharge Exam:  Level of consciousness:  Within normal limits  Appearance: Street clothes, seated, with good grooming  Behavior/Motor: No abnormalities noted  Attitude toward examiner:  Cooperative, attentive, good eye contact  Speech:  spontaneous, normal rate, normal volume and well articulated  Mood:  euthymic  Affect:  mood congruent  Thought processes:  linear, goal directed and coherent  Thought content:  Homocidal ideation denies  Suicidal Ideation:  denies suicidal ideation  Delusions:  no evidence of delusions  Perceptual Disturbance:  denies any perceptual disturbance  Cognition:  In tact  Memory: age appropriate  Insight & Judgement: fair  Medication side effects:  denies     Disposition: home    Patient Instructions: Activity: activity as tolerated    Follow-up as scheduled with his primary care physician and psychiatrist.     Time Spent: 35 minutes    Engagement: Patient displayed a good level of engagement with the treatments offered during this admission. Discharge planning, findings, and recommendations were discussed with the patient.   Signed:  Karla Chu 6/30/2020  8:19 AM  Dragon voice recognition software used in portions of this document. none

## 2021-07-03 ENCOUNTER — EMERGENCY (EMERGENCY)
Facility: HOSPITAL | Age: 26
LOS: 1 days | End: 2021-07-03
Attending: CLINIC/CENTER
Payer: MEDICAID

## 2021-07-03 VITALS
SYSTOLIC BLOOD PRESSURE: 118 MMHG | HEART RATE: 55 BPM | OXYGEN SATURATION: 98 % | DIASTOLIC BLOOD PRESSURE: 64 MMHG | RESPIRATION RATE: 17 BRPM

## 2021-07-03 VITALS
RESPIRATION RATE: 18 BRPM | DIASTOLIC BLOOD PRESSURE: 63 MMHG | HEART RATE: 76 BPM | TEMPERATURE: 98 F | HEIGHT: 63 IN | OXYGEN SATURATION: 100 % | SYSTOLIC BLOOD PRESSURE: 112 MMHG | WEIGHT: 145.06 LBS

## 2021-07-03 DIAGNOSIS — Z98.890 OTHER SPECIFIED POSTPROCEDURAL STATES: Chronic | ICD-10-CM

## 2021-07-03 LAB
ANION GAP SERPL CALC-SCNC: 11 MMOL/L — SIGNIFICANT CHANGE UP (ref 5–17)
BASOPHILS # BLD AUTO: 0.03 K/UL — SIGNIFICANT CHANGE UP (ref 0–0.2)
BASOPHILS NFR BLD AUTO: 0.5 % — SIGNIFICANT CHANGE UP (ref 0–2)
BUN SERPL-MCNC: 10 MG/DL — SIGNIFICANT CHANGE UP (ref 7–23)
CALCIUM SERPL-MCNC: 9.4 MG/DL — SIGNIFICANT CHANGE UP (ref 8.4–10.5)
CHLORIDE SERPL-SCNC: 104 MMOL/L — SIGNIFICANT CHANGE UP (ref 96–108)
CO2 SERPL-SCNC: 25 MMOL/L — SIGNIFICANT CHANGE UP (ref 22–31)
CREAT SERPL-MCNC: 0.61 MG/DL — SIGNIFICANT CHANGE UP (ref 0.5–1.3)
EOSINOPHIL # BLD AUTO: 0.21 K/UL — SIGNIFICANT CHANGE UP (ref 0–0.5)
EOSINOPHIL NFR BLD AUTO: 3.5 % — SIGNIFICANT CHANGE UP (ref 0–6)
GLUCOSE SERPL-MCNC: 97 MG/DL — SIGNIFICANT CHANGE UP (ref 70–99)
HCT VFR BLD CALC: 36.5 % — SIGNIFICANT CHANGE UP (ref 34.5–45)
HGB BLD-MCNC: 11.8 G/DL — SIGNIFICANT CHANGE UP (ref 11.5–15.5)
IMM GRANULOCYTES NFR BLD AUTO: 0.2 % — SIGNIFICANT CHANGE UP (ref 0–1.5)
LYMPHOCYTES # BLD AUTO: 2.41 K/UL — SIGNIFICANT CHANGE UP (ref 1–3.3)
LYMPHOCYTES # BLD AUTO: 40.2 % — SIGNIFICANT CHANGE UP (ref 13–44)
MCHC RBC-ENTMCNC: 29.4 PG — SIGNIFICANT CHANGE UP (ref 27–34)
MCHC RBC-ENTMCNC: 32.3 GM/DL — SIGNIFICANT CHANGE UP (ref 32–36)
MCV RBC AUTO: 91 FL — SIGNIFICANT CHANGE UP (ref 80–100)
MONOCYTES # BLD AUTO: 0.51 K/UL — SIGNIFICANT CHANGE UP (ref 0–0.9)
MONOCYTES NFR BLD AUTO: 8.5 % — SIGNIFICANT CHANGE UP (ref 2–14)
NEUTROPHILS # BLD AUTO: 2.82 K/UL — SIGNIFICANT CHANGE UP (ref 1.8–7.4)
NEUTROPHILS NFR BLD AUTO: 47.1 % — SIGNIFICANT CHANGE UP (ref 43–77)
NRBC # BLD: 0 /100 WBCS — SIGNIFICANT CHANGE UP (ref 0–0)
PLATELET # BLD AUTO: 264 K/UL — SIGNIFICANT CHANGE UP (ref 150–400)
POTASSIUM SERPL-MCNC: 4.3 MMOL/L — SIGNIFICANT CHANGE UP (ref 3.5–5.3)
POTASSIUM SERPL-SCNC: 4.3 MMOL/L — SIGNIFICANT CHANGE UP (ref 3.5–5.3)
RBC # BLD: 4.01 M/UL — SIGNIFICANT CHANGE UP (ref 3.8–5.2)
RBC # FLD: 13 % — SIGNIFICANT CHANGE UP (ref 10.3–14.5)
SODIUM SERPL-SCNC: 140 MMOL/L — SIGNIFICANT CHANGE UP (ref 135–145)
WBC # BLD: 5.99 K/UL — SIGNIFICANT CHANGE UP (ref 3.8–10.5)
WBC # FLD AUTO: 5.99 K/UL — SIGNIFICANT CHANGE UP (ref 3.8–10.5)

## 2021-07-03 PROCEDURE — 70481 CT ORBIT/EAR/FOSSA W/DYE: CPT | Mod: 26,MG

## 2021-07-03 PROCEDURE — 99285 EMERGENCY DEPT VISIT HI MDM: CPT

## 2021-07-03 PROCEDURE — G1004: CPT

## 2021-07-03 PROCEDURE — 85025 COMPLETE CBC W/AUTO DIFF WBC: CPT

## 2021-07-03 PROCEDURE — 99283 EMERGENCY DEPT VISIT LOW MDM: CPT

## 2021-07-03 PROCEDURE — 70481 CT ORBIT/EAR/FOSSA W/DYE: CPT

## 2021-07-03 PROCEDURE — 80048 BASIC METABOLIC PNL TOTAL CA: CPT

## 2021-07-03 PROCEDURE — 99284 EMERGENCY DEPT VISIT MOD MDM: CPT | Mod: 25

## 2021-07-03 RX ORDER — OFLOXACIN 200 MG
4 TABLET ORAL
Qty: 50 | Refills: 0
Start: 2021-07-03 | End: 2021-07-07

## 2021-07-03 RX ORDER — IBUPROFEN 200 MG
400 TABLET ORAL ONCE
Refills: 0 | Status: COMPLETED | OUTPATIENT
Start: 2021-07-03 | End: 2021-07-03

## 2021-07-03 RX ORDER — OFLOXACIN 0.3 %
4 DROPS OPHTHALMIC (EYE) ONCE
Refills: 0 | Status: COMPLETED | OUTPATIENT
Start: 2021-07-03 | End: 2021-07-03

## 2021-07-03 RX ADMIN — Medication 400 MILLIGRAM(S): at 18:19

## 2021-07-03 NOTE — CONSULT NOTE ADULT - SUBJECTIVE AND OBJECTIVE BOX
CC: Right Hearing Loss X 2 days.    HPI:     PAST MEDICAL & SURGICAL HISTORY:  Anxiety  HSV-2 infection  History of dilation and curettage    Allergies  No Known Allergies    Intolerances  MEDICATIONS  (STANDING):  MEDICATIONS  (PRN):    Social History: No smoking/alcohol/drug use.  Family history: None.     ROS:   ENT: all negative except as noted in HPI.   CV: denies palpitations.  Pulm: denies SOB, cough, hemoptysis.  GI: denies change in apetite, indigestion, n/v.  : denies pertinent urinary symptoms, urgency.  Neuro: denies numbness/tingling, loss of sensation.  Psych: denies anxiety.  MS: denies muscle weakness, instability.  Heme: denies easy bruising or bleeding.  Endo: denies heat/cold intolerance, excessive sweating.  Vascular: denies LE edema.    Vital Signs Last 24 Hrs  T(C): 36.6 (03 Jul 2021 19:50), Max: 36.7 (03 Jul 2021 16:14)  T(F): 97.8 (03 Jul 2021 19:50), Max: 98.1 (03 Jul 2021 16:14)  HR: 55 (03 Jul 2021 22:51) (55 - 76)  BP: 118/64 (03 Jul 2021 22:51) (112/63 - 118/64)  BP(mean): --  RR: 17 (03 Jul 2021 22:51) (17 - 18)  SpO2: 98% (03 Jul 2021 22:51) (98% - 100%)                        11.8   5.99  )-----------( 264      ( 03 Jul 2021 21:44 )             36.5    07-03  140  |  104  |  10  ----------------------------<  97  4.3   |  25  |  0.61  Ca    9.4      03 Jul 2021 21:44     PHYSICAL EXAM:  Gen: NAD  Skin: No rashes, bruises, or lesions  Head: Normocephalic, Atraumatic  Face: no edema, erythema, or fluctuance. Parotid glands soft without mass  Eyes: no scleral injection  Ears: Right - Cerumen Impaction in ear canal, debrox gtts applied, removed at bedside. TM intact without effusion or erythema. No evidence of any fluid drainage. No mastoid tenderness, erythema, or ear bulging.          Left - ear canal clear, TM intact without effusion or erythema. No evidence of any fluid drainage. No mastoid tenderness, erythema, or ear bulging  Nose: Nares bilaterally patent, no discharge  Mouth: No Stridor / Drooling / Trismus.  Mucosa moist, tongue/uvula midline, oropharynx clear  Neck: Flat, supple, no lymphadenopathy, trachea midline, no masses  Lymphatic: No lymphadenopathy  Resp: breathing easily, no stridor  CV: no peripheral edema/cyanosis  GI: nondistended   Peripheral vascular: no JVD or edema  Neuro: facial nerve intact, no facial droop.    IMAGING/ADDITIONAL STUDIES:   CT IAC: FINDINGS:  There is no abnormal asymmetric soft tissue swelling of the right pinna or periauricular soft tissues. The right external auditory canal is widely patent. There is mild thickening and retraction of the right tympanic membrane. There is no fluid in the right middle ear cavity or mastoid air cells. There is no erosion of the inner or outer mastoid cortex. There is no coalescence of the mastoid air cells. There is no evidence of a subperiosteal abscess. The ossicles are intact. The inner ears structures are unremarkable.    On the left, there is no abnormal soft tissue swelling of the pinna or periauricular soft tissues. The left external auditory canal is widely patent. There is no thickening of the left tympanic membrane. There is no left middle ear or mastoid fluid. There is no erosion of the inner or outer mastoid cortex. There is no coalescence of the left mastoid air cells. There is no evidence of a subperiosteal abscess. Ossicles are intact. The inner ears structures are unremarkable.    The visualized portions of the brain are unremarkable. There is normal enhancement of the transverse and sigmoid sinuses. There is patchy ethmoid mucosal thickening and opacification, trace fluid in the left maxillary sinus, and a small mucous retention cyst versus polyp in the right maxillary sinus. The intraorbital soft tissues are grossly unremarkable. The visualized portions of the suprahyoid neck are unremarkable.    IMPRESSION: No evidence of right-sided otitis externa. Mild thickening and retraction of the right tympanic membrane. No fluid in the right middle ear cavity or mastoid air cells. No evidence of mastoiditis.  CC: Right Hearing Loss X 2 days.    HPI: 25 YO F with no significant PMH/ PSH presents to [facility] ED for evaluation of Right Hearing loss and Dizziness. Patient used a Q-Tip 2 days ago to clean her right after going swimming and felt a pop and then noticed she could not hear with her right side and feels like she is having balance issues. She waited two days for improvement and now has come in given that she had not had any improvement. ENT was consulted for evaluation. Per pt, " cant hear on the Right Ear at all". Associated with "ear fullness " sensation and balance issues as well. Pt denies ear pain, ear discharge, h/o ear infections in th past, recent medication use, family history of HL, noise exposure or head trauma. Pt denies having any recent URI, fevers/ chills. Also denies dizziness upon position changes,  head movements. Denies cough/sore throat, SOB, N/V, HA/Blurry vision/syncope, sick contacts.     PAST MEDICAL & SURGICAL HISTORY:  Anxiety  HSV-2 infection  History of dilation and curettage    Allergies  No Known Allergies    Intolerances  MEDICATIONS  (STANDING):  MEDICATIONS  (PRN):    Social History: No smoking/alcohol/drug use.  Family history: None.     ROS:   ENT: all negative except as noted in HPI.   CV: denies palpitations.  Pulm: denies SOB, cough, hemoptysis.  GI: denies change in apetite, indigestion, n/v.  : denies pertinent urinary symptoms, urgency.  Neuro: denies numbness/tingling, loss of sensation.  Psych: denies anxiety.  MS: denies muscle weakness, instability.  Heme: denies easy bruising or bleeding.  Endo: denies heat/cold intolerance, excessive sweating.  Vascular: denies LE edema.    Vital Signs Last 24 Hrs  T(C): 36.6 (03 Jul 2021 19:50), Max: 36.7 (03 Jul 2021 16:14)  T(F): 97.8 (03 Jul 2021 19:50), Max: 98.1 (03 Jul 2021 16:14)  HR: 55 (03 Jul 2021 22:51) (55 - 76)  BP: 118/64 (03 Jul 2021 22:51) (112/63 - 118/64)  BP(mean): --  RR: 17 (03 Jul 2021 22:51) (17 - 18)  SpO2: 98% (03 Jul 2021 22:51) (98% - 100%)                        11.8   5.99  )-----------( 264      ( 03 Jul 2021 21:44 )             36.5    07-03  140  |  104  |  10  ----------------------------<  97  4.3   |  25  |  0.61  Ca    9.4      03 Jul 2021 21:44     PHYSICAL EXAM:  Gen: NAD  Skin: No rashes, bruises, or lesions  Head: Normocephalic, Atraumatic  Face: no edema, erythema, or fluctuance. Parotid glands soft without mass  Eyes: no scleral injection  Ears: Right - Cerumen Impaction in ear canal, debrox gtts applied, removed at bedside. TM intact without effusion or erythema. No evidence of any fluid drainage. No mastoid tenderness, erythema, or ear bulging.          Left - ear canal clear, TM intact without effusion or erythema. No evidence of any fluid drainage. No mastoid tenderness, erythema, or ear bulging  Nose: Nares bilaterally patent, no discharge  Mouth: No Stridor / Drooling / Trismus.  Mucosa moist, tongue/uvula midline, oropharynx clear  Neck: Flat, supple, no lymphadenopathy, trachea midline, no masses  Lymphatic: No lymphadenopathy  Resp: breathing easily, no stridor  CV: no peripheral edema/cyanosis  GI: nondistended   Peripheral vascular: no JVD or edema  Neuro: facial nerve intact, no facial droop.    IMAGING/ADDITIONAL STUDIES:   CT IAC: FINDINGS:  There is no abnormal asymmetric soft tissue swelling of the right pinna or periauricular soft tissues. The right external auditory canal is widely patent. There is mild thickening and retraction of the right tympanic membrane. There is no fluid in the right middle ear cavity or mastoid air cells. There is no erosion of the inner or outer mastoid cortex. There is no coalescence of the mastoid air cells. There is no evidence of a subperiosteal abscess. The ossicles are intact. The inner ears structures are unremarkable.    On the left, there is no abnormal soft tissue swelling of the pinna or periauricular soft tissues. The left external auditory canal is widely patent. There is no thickening of the left tympanic membrane. There is no left middle ear or mastoid fluid. There is no erosion of the inner or outer mastoid cortex. There is no coalescence of the left mastoid air cells. There is no evidence of a subperiosteal abscess. Ossicles are intact. The inner ears structures are unremarkable.    The visualized portions of the brain are unremarkable. There is normal enhancement of the transverse and sigmoid sinuses. There is patchy ethmoid mucosal thickening and opacification, trace fluid in the left maxillary sinus, and a small mucous retention cyst versus polyp in the right maxillary sinus. The intraorbital soft tissues are grossly unremarkable. The visualized portions of the suprahyoid neck are unremarkable.    IMPRESSION: No evidence of right-sided otitis externa. Mild thickening and retraction of the right tympanic membrane. No fluid in the right middle ear cavity or mastoid air cells. No evidence of mastoiditis.

## 2021-07-03 NOTE — ED PROVIDER NOTE - CARE PROVIDER_API CALL
Chelsey Zavala (MD)  [facility]  [address] [place] [address] [address]  [place] [address]  Phone: [phone]  Fax: [phone]  Follow Up Time:

## 2021-07-03 NOTE — ED PROVIDER NOTE - PHYSICAL EXAMINATION
Neuro: CN2-12 intact, AOX3, EOMI. PERRLA, 5/5 strength in UE and LE b/l.  Sensation intact in UE/LE b/l. Neg for pronator drift, normal finger to nose, and normal gait

## 2021-07-03 NOTE — CONSULT NOTE ADULT - PROBLEM SELECTOR RECOMMENDATION 9
- Floxin 0.3% 4 gtts BID to Right Ear.   - Prednisone Taper.   - If HL doesn't improve, Audiology test as outpatient.   - F/u ENT clinic on discharge.     [address] [facility], [address]    [place], [place]- [address] [phone].   - Call ENT with questions     # [phone]  ENT PA.

## 2021-07-03 NOTE — ED ADULT NURSE NOTE - OBJECTIVE STATEMENT
26 year old female with no pertinent PMH presents to the ED ambulatory through waiting room complaining of R Ear pain and hearing loss s/p using a Qtip 2 nights ago. Patient endorses feeling a pop when inserting the qtip into right ear and then noticing hearing loss. On assessment, patient appears well. No drainage noted from ear. VSS. Patient in no acute distress.

## 2021-07-03 NOTE — ED PROVIDER NOTE - NSFOLLOWUPINSTRUCTIONS_ED_ALL_ED_FT
- Continue all regular medications  - Take antibiotic drops as prescribed  - For pain, take tylenol or ibuprofen as directed on the packaging  - Follow up with ENT in 1-2 weeks, call to make an appointment  - You were given copies of labs and/or imaging results if applicable, please take them to your follow up appointments  - Return to the ER for fever, severe ear pain or any worsening symptoms or concerns - Continue all regular medications  - Take antibiotic drops as prescribed  - Take medrol dosepak as prescribed  - For pain, take tylenol or ibuprofen as directed on the packaging  - Follow up with ENT in 1 week, call to make an appointment  - You were given copies of labs and/or imaging results if applicable, please take them to your follow up appointments  - Return to the ER for fever, severe ear pain or any worsening symptoms or concerns

## 2021-07-03 NOTE — ED PROVIDER NOTE - OBJECTIVE STATEMENT
26F with no significant PMHx p/w right hearing loss and dizziness after using a q tip. Patient used a q tip 2 days ago to clean her right here and felt a pop and then noticed she could not hear with her right side and feels like she is having balance issues. She waited two days for improvement and now has come in given that she had not had any improvement. 26F with no significant PMHx p/w right hearing loss and dizziness after using a q tip. Patient used a q tip 2 days ago to clean her right after going swimming and felt a pop and then noticed she could not hear with her right side and feels like she is having balance issues. She waited two days for improvement and now has come in given that she had not had any improvement.

## 2021-07-03 NOTE — ED ADULT TRIAGE NOTE - CHIEF COMPLAINT QUOTE
"two days ago, I thought I had water in my ear and I use a Qtip and now I cannot hear at all from my right ear, it hurts and I felt a little dizzy today"

## 2021-07-03 NOTE — ED ADULT NURSE REASSESSMENT NOTE - NS ED NURSE REASSESS COMMENT FT1
Received report @ 1900, pt lying in bed comfort and denies any pain/discomfort. Pt awaiting ENT. VSS. Pt given call bell and educated on how to use

## 2021-07-03 NOTE — ED PROVIDER NOTE - PROGRESS NOTE DETAILS
Kai Quiroz MD: unsuccessful in clearing the cerumen, consulted ENT and will see the patient in the ER. Shirley PGY3: ENT at bedside Shirley PGY3: ENT able to clear out cerumen in ear, normal TM on their exam, no signs of infection. Recommends CT IAC with contrast. Shirley PGY3: Patient reevaluated and feeling better.  Reviewed and discussed results with patient. Discussed importance of follow up and return precautions. Patient agrees with plan.

## 2021-07-03 NOTE — CONSULT NOTE ADULT - ASSESSMENT
27 YO F with no significant PMH/ PSH presents to [facility] ED for evaluation of Right Hearing loss and Dizziness. Patient used a Q-Tip 2 days ago to clean her right after going swimming and felt a pop and then noticed she could not hear with her right side and feels like she is having balance issues. On PE, noted to have cerumen Impaction in Right Ear, debrox gtts applied, suctioned at bedside. TM was intact. CT of Right IAC showed,  No evidence of right-sided otitis externa. Mild thickening and retraction of the right tympanic membrane. No fluid in the right middle ear cavity or mastoid air cells. No evidence of mastoiditis. Pt continues to have have Right HL/Tinnitus  after Cerumen removal. Will recommend steroid Taper and f/u in ENT clinic for Audiology test if HL doesnt improve.

## 2021-07-03 NOTE — ED PROVIDER NOTE - CLINICAL SUMMARY MEDICAL DECISION MAKING FREE TEXT BOX
26F with right sided hearing loss after traumatic use of a q-tip; concern for damage to stapes/incus or malleus and will likely require imaging of the IAC. Will consult ENT regarding next steps.

## 2021-07-03 NOTE — ED ADULT NURSE NOTE - NSFALLRSKHARMRISK_ED_ALL_ED
Home Undelivered Discharge Instructions    After Discharge Orders:    Future Appointments   Date Time Provider Department Center   10/27/2020 10:45 AM Daron Coe MD [facility] [place]   11/3/2020 10:45 AM Daron Coe MD [facility]       Follow up with Dr. Busby on 10/27/20 @ 10:45 AM for prenatal visit.  Call MD for any questions or concerns, [phone]    Current Discharge Medication List      CONTINUE these medications which have NOT CHANGED    Details   aspirin (ECOTRIN) 81 MG EC tablet Take 1 tablet (81 mg total) by mouth once daily.  Qty: 150 tablet, Refills: 2      ferrous sulfate 325 (65 FE) MG EC tablet Take 1 tablet (325 mg total) by mouth 2 (two) times daily.  Qty: 60 tablet, Refills: 3    Associated Diagnoses: Anemia during pregnancy in third trimester      ondansetron (ZOFRAN-ODT) 8 MG TbDL DIS ONE T PO TID PRN N FOR 3 DAYS  Refills: 0      pantoprazole (PROTONIX) 40 MG tablet Take 1 tablet (40 mg total) by mouth once daily. for 14 days  Qty: 14 tablet, Refills: 0    Associated Diagnoses: Gastroesophageal reflux disease, esophagitis presence not specified      -IRON-FOLATE-DHA ORAL Take 1 Dose by mouth once daily.                     · Diet:  normal diet as tolerated    · Rest: normal activity as tolerated    Other instructions: Do kick counts once a day on your baby. Choose the time of day your baby is most active. Get in a comfortable lying or sitting position and time how long it takes to feel 10 kicks, twists, turns, swishes, or rolls. Call your physician or midwife if there have not been 10 kicks in 2 hours    Call physician or midwife, return to Labor and Delivery, call 911, or go to the nearest Emergency Room if: increased leakage or fluid, contractions more than  3 per  1 hour, decreased fetal movement, persistent low back pain or cramping, bleeding from vaginal area, difficulty urinating, pain with urination, difficulty breathing, new calf  pain, persistent headache or vision change         no

## 2021-07-03 NOTE — ED PROVIDER NOTE - SHIFT CHANGE DETAILS
pt signed out to me pending results of the CT scan, pt TM intact on the R side pt understands to follow up with ENT for further management of symptoms ear canal w/ inflammation, counselled on otic drops

## 2021-07-03 NOTE — ED PROVIDER NOTE - ATTENDING CONTRIBUTION TO CARE
Attending Statement: I have personally seen and examined this patient.  I have fully participated in the care of this patient. I have reviewed all pertinent clinical information, including history, physical exam, plan and the Resident’s note and agree except as noted.     no pmh, p.w right sided hear loss s/p cleaning ear with q-tip 2days  ago. associated with dizziness w.o n/v. neg neuro exam and normal gait. no fever, n/v, vision changes, unable to visualize the  TM on the right, mild erythema of the right auditory canal, concerning for cerumen impaction vs ostitis media vs TM perforation. w. obvious trigger unlikely ICH or bleed or mass at this time will hold CT head. will attempt to clear cerumen and possible ent consult. unlikely Meniere and labyrinthitis.     General: NAD, good hygiene, well developed  HENT: Atraumatic, EOMI, unable to visualize the TM on the right w. erythema of the ear canal, TM intact on the left, + light reflex, no conjunctivae injection, moist mucosa.  Neck: normal ROM and trachea midline   Cardiovascular: RRR, S1&2, no M or R, radial pulses equal and b/l  Respiratory: CTABL, no wheezes or crackles, no decreased breath sounds  Abdominal: soft and non-tender non distended, neg for guarding, ferraro's sign, rovsing's sign, mcburney's sign, no CVA tenderness   Extremities: no edema of the legs/feet, DP/PT equal b/l  Skin: warm, well perfused  Neurologic: nonfocal, AAOx3  Psych: normal mood and affect

## 2021-07-03 NOTE — ED PROVIDER NOTE - ENMT, MLM
Ear: right TM present and erythematous; somewhat tender on exam. Left EM normal. No inflammation visualized from the external ear. No wax noted.

## 2021-07-04 DIAGNOSIS — H91.90 UNSPECIFIED HEARING LOSS, UNSPECIFIED EAR: ICD-10-CM

## 2021-07-04 RX ADMIN — Medication 4 DROP(S): at 00:01

## 2022-03-07 ENCOUNTER — ASOB RESULT (OUTPATIENT)
Age: 27
End: 2022-03-07

## 2022-03-07 ENCOUNTER — APPOINTMENT (OUTPATIENT)
Dept: ANTEPARTUM | Facility: CLINIC | Age: 27
End: 2022-03-07
Payer: MEDICAID

## 2022-03-07 PROCEDURE — 76813 OB US NUCHAL MEAS 1 GEST: CPT

## 2022-03-07 PROCEDURE — 76801 OB US < 14 WKS SINGLE FETUS: CPT | Mod: 59

## 2022-03-08 NOTE — ED ADULT NURSE NOTE - ALCOHOL PRE SCREEN (AUDIT - C)
[FreeTextEntry1] : Mr. ONOFRE Statement Selected

## 2022-05-03 ENCOUNTER — ASOB RESULT (OUTPATIENT)
Age: 27
End: 2022-05-03

## 2022-05-03 ENCOUNTER — APPOINTMENT (OUTPATIENT)
Dept: ANTEPARTUM | Facility: CLINIC | Age: 27
End: 2022-05-03
Payer: MEDICAID

## 2022-05-03 PROCEDURE — 76811 OB US DETAILED SNGL FETUS: CPT

## 2022-05-10 ENCOUNTER — APPOINTMENT (OUTPATIENT)
Dept: ANTEPARTUM | Facility: CLINIC | Age: 27
End: 2022-05-10
Payer: MEDICAID

## 2022-05-10 ENCOUNTER — ASOB RESULT (OUTPATIENT)
Age: 27
End: 2022-05-10

## 2022-05-10 PROCEDURE — 76816 OB US FOLLOW-UP PER FETUS: CPT

## 2022-06-28 NOTE — ED PROVIDER NOTE - NS ED ATTENDING STATEMENT MOD
"Pt to ER via EMS with c/o shortness of breath, h/o asthma. Picked up on [employer] [address]. Homeless. 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 Also reports ""bee stings\"" to face and \""needs a skin graft to fix\"" pt has multiple complaints and flights of ideas during triage. Hard to follow what concerns today are.    "
Attending Only

## 2022-08-18 ENCOUNTER — APPOINTMENT (OUTPATIENT)
Dept: ANTEPARTUM | Facility: CLINIC | Age: 27
End: 2022-08-18

## 2022-08-18 ENCOUNTER — ASOB RESULT (OUTPATIENT)
Age: 27
End: 2022-08-18

## 2022-08-18 PROCEDURE — 76819 FETAL BIOPHYS PROFIL W/O NST: CPT

## 2022-08-18 PROCEDURE — 76816 OB US FOLLOW-UP PER FETUS: CPT

## 2022-08-28 ENCOUNTER — OUTPATIENT (OUTPATIENT)
Dept: OUTPATIENT SERVICES | Facility: HOSPITAL | Age: 27
LOS: 1 days | End: 2022-08-28
Payer: MEDICAID

## 2022-08-28 VITALS — HEART RATE: 65 BPM | SYSTOLIC BLOOD PRESSURE: 118 MMHG | DIASTOLIC BLOOD PRESSURE: 74 MMHG

## 2022-08-28 VITALS — HEART RATE: 62 BPM | OXYGEN SATURATION: 93 %

## 2022-08-28 DIAGNOSIS — Z3A.00 WEEKS OF GESTATION OF PREGNANCY NOT SPECIFIED: ICD-10-CM

## 2022-08-28 DIAGNOSIS — O26.899 OTHER SPECIFIED PREGNANCY RELATED CONDITIONS, UNSPECIFIED TRIMESTER: ICD-10-CM

## 2022-08-28 DIAGNOSIS — Z98.890 OTHER SPECIFIED POSTPROCEDURAL STATES: Chronic | ICD-10-CM

## 2022-08-28 PROCEDURE — 59025 FETAL NON-STRESS TEST: CPT | Mod: 26

## 2022-08-28 PROCEDURE — 59025 FETAL NON-STRESS TEST: CPT

## 2022-08-28 PROCEDURE — G0463: CPT

## 2022-08-28 NOTE — OB PROVIDER TRIAGE NOTE - NSOBPROVIDERNOTE_OBGYN_ALL_OB_FT
A/P: 27y  at 38 weeks for r/o labor and dec FM  -Observe   -BPP 8/8/ STEF 8.9  -Labor: 60/-3   -Fetus: Cat I tracing. Continuous toco and fetal monitoring.   -GBS:+  -DVT ppx: Ambulate and SCD's while in bed     to be discussed with attending once VE repeated at 9am    Christina Dale, PGY2 A/P: 27y  at 38 weeks for r/o labor and dec FM  -Observe   -BPP 8/8/ STEF 8.9  -Labor: 60/-3   -Fetus: Cat I tracing. Continuous toco and fetal monitoring.   -GBS:+  -DVT ppx: Ambulate and SCD's while in bed     to be discussed with attending once VE repeated at 9am        Christina Dale, PGY2    OB PA Addendum @ 950a  Pt comfortable  T(C): 36.7 (22 @ 09:04), Max: 36.7 (22 @ 06:29)  HR: 65 (22 @ 09:45) (56 - 93)  BP: 118/74 (22 @ 09:45) (108/66 - 122/86)  RR: 18 (22 @ 06:29) (18 - 18)  SpO2: 99% (22 @ 09:42) (92% - 99%)    NST: 140's cat 1  TOCO: q 7 min  VE: 60/-3  unchanged from prior exam  No evidence of active labor at this time  Kourtney Graham PA-C

## 2022-08-28 NOTE — OB PROVIDER TRIAGE NOTE - HISTORY OF PRESENT ILLNESS
27y GP at _ weeks GA presents to L&D for . Patient denies vaginal bleeding, contractions and leakage of fluid. She endorses good fetal movement. Denies fevers, chills, nausea and vomiting. No other complaints at this time. Prenatal course is significant for:     Prenatal course uncomplicated.    FAMILIA:      POB:  PGYN: denies fibroids, ovarian cysts, STD hx, or abnormal PAPs   PMH: Denies  PSH: Denies  SH: Denies EtOH, tobacco and illicit drug use during this pregnancy; feels safe at home   Psych Hx: denies hx of anxiety or depression  Meds: PNVs  Allergies: NKDA    EFW:    GBS:       T(C): 36.7 (08-28-22 @ 06:29), Max: 36.7 (08-28-22 @ 06:29)  HR: 69 (08-28-22 @ 07:57) (60 - 93)  BP: 122/86 (08-28-22 @ 06:29) (122/86 - 122/86)  RR: 18 (08-28-22 @ 06:29) (18 - 18)  SpO2: 98% (08-28-22 @ 07:57) (92% - 99%)  Gen: NAD, well-appearing   Lungs: CTAB  Heart: RRR   Abd: Soft, gravid  SVE:    Bedside sono:  FHT:  [place]:            27y  at 37.3 weeks GA presents to L&D for dec FM and ROL. Patient endorses minimal cramping pain at home which she attributes to [place] contractions. Patient denies vaginal bleeding and leakage of fluid. She endorses good fetal movement. Denies fevers, chills, nausea and vomiting. No other complaints at this time. Prenatal course is significant for:  recent HSV lesions 2 weeks ago on Valtrex suppression with resolution of lesions, vanishing twin early in pregnancy, GBS+.    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

## 2022-08-28 NOTE — OB RN TRIAGE NOTE - SPO2 (%)
Clinic Care Coordination Contact  [facility]/Voicemail    Referral Source: Care Team  CC RN outreach to get updates on patient status, assess goal progress, and provide support and additional resources as needed.    Clinical Data: Care Coordinator Outreach  Outreach attempted x 2.  Left message on patient's voicemail with call back information and requested return call.  Plan: Care Coordinator will try to reach patient again in approximately 2 weeks.    Solo Dia RN  Clinic Care Coordinator  [facility]  Ph: [phone]   99

## 2022-08-28 NOTE — OB RN TRIAGE NOTE - FALL HARM RISK - UNIVERSAL INTERVENTIONS
Bed in lowest position, wheels locked, appropriate side rails in place/Call bell, personal items and telephone in reach/Instruct patient to call for assistance before getting out of bed or chair/Non-slip footwear when patient is out of bed/[place] to call system/Physically safe environment - no spills, clutter or unnecessary equipment/Purposeful Proactive Rounding/Room/bathroom lighting operational, light cord in reach

## 2022-08-28 NOTE — OB PROVIDER TRIAGE NOTE - NSHPPHYSICALEXAM_GEN_ALL_CORE
T(C): 36.7 (08-28-22 @ 06:29), Max: 36.7 (08-28-22 @ 06:29)  HR: 69 (08-28-22 @ 07:57) (60 - 93)  BP: 122/86 (08-28-22 @ 06:29) (122/86 - 122/86)  RR: 18 (08-28-22 @ 06:29) (18 - 18)  SpO2: 98% (08-28-22 @ 07:57) (92% - 99%)  Gen: NAD, well-appearing   Lungs: CTAB  Heart: RRR   Abd: Soft, gravid  SVE:  2/60/-3  SSE: no pooling, no fluid seen, cervical mucus seen, no HSV lesions seen  Bedside sono: vertex BPP 8/8 STEF 8.9  FHT: 130/mod dieter/+acels/-decels  [place]: q5min then only uterine irritability.

## 2022-09-03 RX ADMIN — Medication 975 MILLIGRAM(S): at 07:11

## 2022-09-08 ENCOUNTER — INPATIENT (INPATIENT)
Facility: HOSPITAL | Age: 27
LOS: 1 days | Discharge: HOME | End: 2022-09-10
Attending: OBSTETRICS & GYNECOLOGY | Admitting: OBSTETRICS & GYNECOLOGY
Payer: MEDICAID

## 2022-09-08 VITALS
OXYGEN SATURATION: 99 % | SYSTOLIC BLOOD PRESSURE: 126 MMHG | HEART RATE: 96 BPM | TEMPERATURE: 99 F | DIASTOLIC BLOOD PRESSURE: 85 MMHG | RESPIRATION RATE: 18 BRPM

## 2022-09-08 DIAGNOSIS — O26.899 OTHER SPECIFIED PREGNANCY RELATED CONDITIONS, UNSPECIFIED TRIMESTER: ICD-10-CM

## 2022-09-08 DIAGNOSIS — Z3A.00 WEEKS OF GESTATION OF PREGNANCY NOT SPECIFIED: ICD-10-CM

## 2022-09-08 DIAGNOSIS — Z34.80 ENCOUNTER FOR SUPERVISION OF OTHER NORMAL PREGNANCY, UNSPECIFIED TRIMESTER: ICD-10-CM

## 2022-09-08 DIAGNOSIS — Z98.890 OTHER SPECIFIED POSTPROCEDURAL STATES: Chronic | ICD-10-CM

## 2022-09-08 LAB
ALBUMIN SERPL ELPH-MCNC: 3.8 G/DL — SIGNIFICANT CHANGE UP (ref 3.3–5)
ALP SERPL-CCNC: 180 U/L — HIGH (ref 40–120)
ALT FLD-CCNC: 11 U/L — SIGNIFICANT CHANGE UP (ref 10–45)
ANION GAP SERPL CALC-SCNC: 13 MMOL/L — SIGNIFICANT CHANGE UP (ref 5–17)
APPEARANCE UR: ABNORMAL
APTT BLD: 26 SEC — LOW (ref 27.5–35.5)
AST SERPL-CCNC: 17 U/L — SIGNIFICANT CHANGE UP (ref 10–40)
BACTERIA # UR AUTO: NEGATIVE — SIGNIFICANT CHANGE UP
BASOPHILS # BLD AUTO: 0.02 K/UL — SIGNIFICANT CHANGE UP (ref 0–0.2)
BASOPHILS # BLD AUTO: 0.03 K/UL — SIGNIFICANT CHANGE UP (ref 0–0.2)
BASOPHILS NFR BLD AUTO: 0.2 % — SIGNIFICANT CHANGE UP (ref 0–2)
BASOPHILS NFR BLD AUTO: 0.3 % — SIGNIFICANT CHANGE UP (ref 0–2)
BILIRUB SERPL-MCNC: 0.8 MG/DL — SIGNIFICANT CHANGE UP (ref 0.2–1.2)
BILIRUB UR-MCNC: NEGATIVE — SIGNIFICANT CHANGE UP
BUN SERPL-MCNC: 7 MG/DL — SIGNIFICANT CHANGE UP (ref 7–23)
CALCIUM SERPL-MCNC: 9.8 MG/DL — SIGNIFICANT CHANGE UP (ref 8.4–10.5)
CHLORIDE SERPL-SCNC: 102 MMOL/L — SIGNIFICANT CHANGE UP (ref 96–108)
CO2 SERPL-SCNC: 20 MMOL/L — LOW (ref 22–31)
COLOR SPEC: SIGNIFICANT CHANGE UP
COVID-19 SPIKE DOMAIN AB INTERP: POSITIVE
COVID-19 SPIKE DOMAIN ANTIBODY RESULT: >250 U/ML — HIGH
CREAT ?TM UR-MCNC: 46 MG/DL — SIGNIFICANT CHANGE UP
CREAT SERPL-MCNC: 0.5 MG/DL — SIGNIFICANT CHANGE UP (ref 0.5–1.3)
DIFF PNL FLD: ABNORMAL
EGFR: 130 ML/MIN/1.73M2 — SIGNIFICANT CHANGE UP
EOSINOPHIL # BLD AUTO: 0.01 K/UL — SIGNIFICANT CHANGE UP (ref 0–0.5)
EOSINOPHIL # BLD AUTO: 0.02 K/UL — SIGNIFICANT CHANGE UP (ref 0–0.5)
EOSINOPHIL NFR BLD AUTO: 0.1 % — SIGNIFICANT CHANGE UP (ref 0–6)
EOSINOPHIL NFR BLD AUTO: 0.2 % — SIGNIFICANT CHANGE UP (ref 0–6)
EPI CELLS # UR: 34 /HPF — HIGH
FIBRINOGEN PPP-MCNC: 659 MG/DL — HIGH (ref 330–520)
GLUCOSE SERPL-MCNC: 81 MG/DL — SIGNIFICANT CHANGE UP (ref 70–99)
GLUCOSE UR QL: NEGATIVE — SIGNIFICANT CHANGE UP
HBV SURFACE AG SERPL QL IA: SIGNIFICANT CHANGE UP
HCT VFR BLD CALC: 33 % — LOW (ref 34.5–45)
HCT VFR BLD CALC: 35.7 % — SIGNIFICANT CHANGE UP (ref 34.5–45)
HGB BLD-MCNC: 10.7 G/DL — LOW (ref 11.5–15.5)
HGB BLD-MCNC: 11.6 G/DL — SIGNIFICANT CHANGE UP (ref 11.5–15.5)
HIV 1 & 2 AB SERPL IA.RAPID: SIGNIFICANT CHANGE UP
HYALINE CASTS # UR AUTO: 0 /LPF — SIGNIFICANT CHANGE UP (ref 0–2)
IMM GRANULOCYTES NFR BLD AUTO: 0.3 % — SIGNIFICANT CHANGE UP (ref 0–1.5)
IMM GRANULOCYTES NFR BLD AUTO: 0.3 % — SIGNIFICANT CHANGE UP (ref 0–1.5)
INR BLD: 0.96 RATIO — SIGNIFICANT CHANGE UP (ref 0.88–1.16)
KETONES UR-MCNC: ABNORMAL
LDH SERPL L TO P-CCNC: 158 U/L — SIGNIFICANT CHANGE UP (ref 50–242)
LEUKOCYTE ESTERASE UR-ACNC: ABNORMAL
LYMPHOCYTES # BLD AUTO: 1.85 K/UL — SIGNIFICANT CHANGE UP (ref 1–3.3)
LYMPHOCYTES # BLD AUTO: 1.96 K/UL — SIGNIFICANT CHANGE UP (ref 1–3.3)
LYMPHOCYTES # BLD AUTO: 20.3 % — SIGNIFICANT CHANGE UP (ref 13–44)
LYMPHOCYTES # BLD AUTO: 21.8 % — SIGNIFICANT CHANGE UP (ref 13–44)
MCHC RBC-ENTMCNC: 29 PG — SIGNIFICANT CHANGE UP (ref 27–34)
MCHC RBC-ENTMCNC: 29.3 PG — SIGNIFICANT CHANGE UP (ref 27–34)
MCHC RBC-ENTMCNC: 32.4 GM/DL — SIGNIFICANT CHANGE UP (ref 32–36)
MCHC RBC-ENTMCNC: 32.5 GM/DL — SIGNIFICANT CHANGE UP (ref 32–36)
MCV RBC AUTO: 89.4 FL — SIGNIFICANT CHANGE UP (ref 80–100)
MCV RBC AUTO: 90.2 FL — SIGNIFICANT CHANGE UP (ref 80–100)
MONOCYTES # BLD AUTO: 0.51 K/UL — SIGNIFICANT CHANGE UP (ref 0–0.9)
MONOCYTES # BLD AUTO: 0.56 K/UL — SIGNIFICANT CHANGE UP (ref 0–0.9)
MONOCYTES NFR BLD AUTO: 5.6 % — SIGNIFICANT CHANGE UP (ref 2–14)
MONOCYTES NFR BLD AUTO: 6.2 % — SIGNIFICANT CHANGE UP (ref 2–14)
NEUTROPHILS # BLD AUTO: 6.4 K/UL — SIGNIFICANT CHANGE UP (ref 1.8–7.4)
NEUTROPHILS # BLD AUTO: 6.71 K/UL — SIGNIFICANT CHANGE UP (ref 1.8–7.4)
NEUTROPHILS NFR BLD AUTO: 71.2 % — SIGNIFICANT CHANGE UP (ref 43–77)
NEUTROPHILS NFR BLD AUTO: 73.5 % — SIGNIFICANT CHANGE UP (ref 43–77)
NITRITE UR-MCNC: NEGATIVE — SIGNIFICANT CHANGE UP
NRBC # BLD: 0 /100 WBCS — SIGNIFICANT CHANGE UP (ref 0–0)
NRBC # BLD: 0 /100 WBCS — SIGNIFICANT CHANGE UP (ref 0–0)
PH UR: 6.5 — SIGNIFICANT CHANGE UP (ref 5–8)
PLATELET # BLD AUTO: 175 K/UL — SIGNIFICANT CHANGE UP (ref 150–400)
PLATELET # BLD AUTO: 211 K/UL — SIGNIFICANT CHANGE UP (ref 150–400)
POTASSIUM SERPL-MCNC: 4 MMOL/L — SIGNIFICANT CHANGE UP (ref 3.5–5.3)
POTASSIUM SERPL-SCNC: 4 MMOL/L — SIGNIFICANT CHANGE UP (ref 3.5–5.3)
PROT ?TM UR-MCNC: 11 MG/DL — SIGNIFICANT CHANGE UP (ref 0–12)
PROT SERPL-MCNC: 6.8 G/DL — SIGNIFICANT CHANGE UP (ref 6–8.3)
PROT UR-MCNC: NEGATIVE — SIGNIFICANT CHANGE UP
PROT/CREAT UR-RTO: 0.2 RATIO — SIGNIFICANT CHANGE UP (ref 0–0.2)
PROTHROM AB SERPL-ACNC: 11 SEC — SIGNIFICANT CHANGE UP (ref 10.5–13.4)
RBC # BLD: 3.69 M/UL — LOW (ref 3.8–5.2)
RBC # BLD: 3.96 M/UL — SIGNIFICANT CHANGE UP (ref 3.8–5.2)
RBC # FLD: 13.8 % — SIGNIFICANT CHANGE UP (ref 10.3–14.5)
RBC # FLD: 14 % — SIGNIFICANT CHANGE UP (ref 10.3–14.5)
RBC CASTS # UR COMP ASSIST: 1 /HPF — SIGNIFICANT CHANGE UP (ref 0–4)
SARS-COV-2 IGG+IGM SERPL QL IA: >250 U/ML — HIGH
SARS-COV-2 IGG+IGM SERPL QL IA: POSITIVE
SARS-COV-2 RNA SPEC QL NAA+PROBE: SIGNIFICANT CHANGE UP
SODIUM SERPL-SCNC: 135 MMOL/L — SIGNIFICANT CHANGE UP (ref 135–145)
SP GR SPEC: 1.01 — SIGNIFICANT CHANGE UP (ref 1.01–1.02)
URATE SERPL-MCNC: 3.8 MG/DL — SIGNIFICANT CHANGE UP (ref 2.5–7)
UROBILINOGEN FLD QL: NEGATIVE — SIGNIFICANT CHANGE UP
WBC # BLD: 9 K/UL — SIGNIFICANT CHANGE UP (ref 3.8–10.5)
WBC # BLD: 9.13 K/UL — SIGNIFICANT CHANGE UP (ref 3.8–10.5)
WBC # FLD AUTO: 9 K/UL — SIGNIFICANT CHANGE UP (ref 3.8–10.5)
WBC # FLD AUTO: 9.13 K/UL — SIGNIFICANT CHANGE UP (ref 3.8–10.5)
WBC UR QL: 2 /HPF — SIGNIFICANT CHANGE UP (ref 0–5)

## 2022-09-08 RX ORDER — MAGNESIUM HYDROXIDE 400 MG/1
30 TABLET, CHEWABLE ORAL
Refills: 0 | Status: DISCONTINUED | OUTPATIENT
Start: 2022-09-08 | End: 2022-09-10

## 2022-09-08 RX ORDER — FAMOTIDINE 10 MG/ML
20 INJECTION INTRAVENOUS ONCE
Refills: 0 | Status: COMPLETED | OUTPATIENT
Start: 2022-09-08 | End: 2022-09-08

## 2022-09-08 RX ORDER — BENZOCAINE 10 %
1 GEL (GRAM) MUCOUS MEMBRANE EVERY 6 HOURS
Refills: 0 | Status: DISCONTINUED | OUTPATIENT
Start: 2022-09-08 | End: 2022-09-10

## 2022-09-08 RX ORDER — LANOLIN
1 OINTMENT (GRAM) TOPICAL EVERY 6 HOURS
Refills: 0 | Status: DISCONTINUED | OUTPATIENT
Start: 2022-09-08 | End: 2022-09-10

## 2022-09-08 RX ORDER — INFLUENZA VIRUS VACCINE 15; 15; 15; 15 UG/.5ML; UG/.5ML; UG/.5ML; UG/.5ML
0.5 SUSPENSION INTRAMUSCULAR ONCE
Refills: 0 | Status: DISCONTINUED | OUTPATIENT
Start: 2022-09-08 | End: 2022-09-10

## 2022-09-08 RX ORDER — CLONAZEPAM 1 MG
0.25 TABLET ORAL EVERY 12 HOURS
Refills: 0 | Status: DISCONTINUED | OUTPATIENT
Start: 2022-09-08 | End: 2022-09-08

## 2022-09-08 RX ORDER — AER TRAVELER 0.5 G/1
1 SOLUTION RECTAL; TOPICAL EVERY 4 HOURS
Refills: 0 | Status: DISCONTINUED | OUTPATIENT
Start: 2022-09-08 | End: 2022-09-10

## 2022-09-08 RX ORDER — CLONAZEPAM 1 MG
0.5 TABLET ORAL EVERY 12 HOURS
Refills: 0 | Status: DISCONTINUED | OUTPATIENT
Start: 2022-09-08 | End: 2022-09-10

## 2022-09-08 RX ORDER — ACETAMINOPHEN 500 MG
975 TABLET ORAL ONCE
Refills: 0 | Status: COMPLETED | OUTPATIENT
Start: 2022-09-08 | End: 2022-09-08

## 2022-09-08 RX ORDER — CITRIC ACID/SODIUM CITRATE 300-500 MG
15 SOLUTION, ORAL ORAL EVERY 6 HOURS
Refills: 0 | Status: DISCONTINUED | OUTPATIENT
Start: 2022-09-08 | End: 2022-09-08

## 2022-09-08 RX ORDER — OXYTOCIN 10 UNIT/ML
333.33 VIAL (ML) INJECTION
Qty: 20 | Refills: 0 | Status: DISCONTINUED | OUTPATIENT
Start: 2022-09-08 | End: 2022-09-10

## 2022-09-08 RX ORDER — DIBUCAINE 1 %
1 OINTMENT (GRAM) RECTAL EVERY 6 HOURS
Refills: 0 | Status: DISCONTINUED | OUTPATIENT
Start: 2022-09-08 | End: 2022-09-10

## 2022-09-08 RX ORDER — SODIUM CHLORIDE 9 MG/ML
1000 INJECTION, SOLUTION INTRAVENOUS
Refills: 0 | Status: DISCONTINUED | OUTPATIENT
Start: 2022-09-08 | End: 2022-09-08

## 2022-09-08 RX ORDER — PRAMOXINE HYDROCHLORIDE 150 MG/15G
1 AEROSOL, FOAM RECTAL EVERY 4 HOURS
Refills: 0 | Status: DISCONTINUED | OUTPATIENT
Start: 2022-09-08 | End: 2022-09-10

## 2022-09-08 RX ORDER — IBUPROFEN 200 MG
600 TABLET ORAL EVERY 6 HOURS
Refills: 0 | Status: DISCONTINUED | OUTPATIENT
Start: 2022-09-08 | End: 2022-09-10

## 2022-09-08 RX ORDER — ACETAMINOPHEN 500 MG
975 TABLET ORAL
Refills: 0 | Status: DISCONTINUED | OUTPATIENT
Start: 2022-09-08 | End: 2022-09-10

## 2022-09-08 RX ORDER — KETOROLAC TROMETHAMINE 30 MG/ML
30 SYRINGE (ML) INJECTION ONCE
Refills: 0 | Status: DISCONTINUED | OUTPATIENT
Start: 2022-09-08 | End: 2022-09-08

## 2022-09-08 RX ORDER — OXYCODONE HYDROCHLORIDE 5 MG/1
5 TABLET ORAL ONCE
Refills: 0 | Status: DISCONTINUED | OUTPATIENT
Start: 2022-09-08 | End: 2022-09-10

## 2022-09-08 RX ORDER — AMPICILLIN TRIHYDRATE 250 MG
2 CAPSULE ORAL ONCE
Refills: 0 | Status: COMPLETED | OUTPATIENT
Start: 2022-09-08 | End: 2022-09-08

## 2022-09-08 RX ORDER — HYDROCORTISONE 1 %
1 OINTMENT (GRAM) TOPICAL EVERY 6 HOURS
Refills: 0 | Status: DISCONTINUED | OUTPATIENT
Start: 2022-09-08 | End: 2022-09-10

## 2022-09-08 RX ORDER — AMPICILLIN TRIHYDRATE 250 MG
1 CAPSULE ORAL EVERY 4 HOURS
Refills: 0 | Status: DISCONTINUED | OUTPATIENT
Start: 2022-09-08 | End: 2022-09-08

## 2022-09-08 RX ORDER — IBUPROFEN 200 MG
600 TABLET ORAL EVERY 6 HOURS
Refills: 0 | Status: COMPLETED | OUTPATIENT
Start: 2022-09-08 | End: 2023-08-07

## 2022-09-08 RX ORDER — SODIUM CHLORIDE 9 MG/ML
3 INJECTION INTRAMUSCULAR; INTRAVENOUS; SUBCUTANEOUS EVERY 8 HOURS
Refills: 0 | Status: DISCONTINUED | OUTPATIENT
Start: 2022-09-08 | End: 2022-09-10

## 2022-09-08 RX ORDER — TETANUS TOXOID, REDUCED DIPHTHERIA TOXOID AND ACELLULAR PERTUSSIS VACCINE, ADSORBED 5; 2.5; 8; 8; 2.5 [IU]/.5ML; [IU]/.5ML; UG/.5ML; UG/.5ML; UG/.5ML
0.5 SUSPENSION INTRAMUSCULAR ONCE
Refills: 0 | Status: DISCONTINUED | OUTPATIENT
Start: 2022-09-08 | End: 2022-09-10

## 2022-09-08 RX ORDER — CHLORHEXIDINE GLUCONATE 213 G/1000ML
1 SOLUTION TOPICAL ONCE
Refills: 0 | Status: DISCONTINUED | OUTPATIENT
Start: 2022-09-08 | End: 2022-09-08

## 2022-09-08 RX ORDER — OXYTOCIN 10 UNIT/ML
4 VIAL (ML) INJECTION
Qty: 30 | Refills: 0 | Status: DISCONTINUED | OUTPATIENT
Start: 2022-09-08 | End: 2022-09-08

## 2022-09-08 RX ORDER — DIPHENHYDRAMINE HCL 50 MG
25 CAPSULE ORAL EVERY 6 HOURS
Refills: 0 | Status: DISCONTINUED | OUTPATIENT
Start: 2022-09-08 | End: 2022-09-10

## 2022-09-08 RX ORDER — SIMETHICONE 80 MG/1
80 TABLET, CHEWABLE ORAL EVERY 4 HOURS
Refills: 0 | Status: DISCONTINUED | OUTPATIENT
Start: 2022-09-08 | End: 2022-09-10

## 2022-09-08 RX ORDER — OXYCODONE HYDROCHLORIDE 5 MG/1
5 TABLET ORAL
Refills: 0 | Status: DISCONTINUED | OUTPATIENT
Start: 2022-09-08 | End: 2022-09-10

## 2022-09-08 RX ADMIN — Medication 975 MILLIGRAM(S): at 12:52

## 2022-09-08 RX ADMIN — Medication 30 MILLIGRAM(S): at 21:57

## 2022-09-08 RX ADMIN — Medication 200 GRAM(S): at 15:07

## 2022-09-08 RX ADMIN — Medication 0.5 MILLIGRAM(S): at 16:23

## 2022-09-08 RX ADMIN — Medication 108 GRAM(S): at 19:06

## 2022-09-08 RX ADMIN — Medication 4 MILLIUNIT(S)/MIN: at 16:51

## 2022-09-08 RX ADMIN — SODIUM CHLORIDE 125 MILLILITER(S): 9 INJECTION, SOLUTION INTRAVENOUS at 15:06

## 2022-09-08 RX ADMIN — FAMOTIDINE 20 MILLIGRAM(S): 10 INJECTION INTRAVENOUS at 16:24

## 2022-09-08 NOTE — OB PROVIDER H&P - ASSESSMENT
A/P: 26 y/o G 4 P 2012 @39.0 wks admitted in early labor, requesting epidural.   - Admit to L&D  - Routine labs, IVF, NPO  - Labile BPs: HELLP wnl, no s/sx sPEC  - EFM: reactive  - GBS: positive, amp ordered  - EFW: 3200g  - Augmentation of labor with pitocin  - Discussed with Dr. Ynes Nelson PA-C

## 2022-09-08 NOTE — OB RN TRIAGE NOTE - NS_TRIAGETIMEOF NOTIFICATION_OBGYN_ALL_OB_DT
Patient Name: Marvel Parsons  : 1964  MRN: [id number]  Patient Address: [address] [place] [address]      Coronavirus Disease 2019 (COVID-19)     [facility] is committed to the safety and well-being of our patients, mona carefully. If your symptoms get worse, call your healthcare provider immediately. 3. Get rest and stay hydrated.    4. If you have a medical appointment, call the healthcare provider ahead of time and tell them that you have or may have COVID-19.  5. For m of fever-reducing medications; and  · Improvement in respiratory symptoms (e.g., cough, shortness of breath); and  · At least 10 days have passed since symptoms first appeared OR if asymptomatic patient or date of symptom onset is unclear then use 10 days donors must:    · Have had a confirmed diagnosis of COVID-19  · Be symptom-free for at least 14 days*    *Some people will be required to have a repeat COVID-19 test in order to be eligible to donate.  If you’re instructed by Brandy that a repeat test is r random. Researchers are trying to identify similarities between people with a Post-COVID condition to better understand if there are risk factors. How do I prevent a Post-COVID condition?   The best way to prevent the long-term symptoms of COVID-19 is 08-Sep-2022 10:55

## 2022-09-08 NOTE — OB RN PATIENT PROFILE - FUNCTIONAL ASSESSMENT - BASIC MOBILITY 5.
4 = No assist / stand by assistance Pt will achieve desirable, gradual wt loss 1-2lbs/week to optimize nutrition status

## 2022-09-08 NOTE — OB PROVIDER LABOR PROGRESS NOTE - ASSESSMENT
AROM, clear fluid   will place peanut ball  analgesia prn  continue pitocin  expectant mgmt    RAKESH Logan 
ampicillin for GBS prophylaxis   analgesia prn  will start pitocin for labor augmentation  expectant mgmt    RAKESH Logan

## 2022-09-08 NOTE — OB PROVIDER DELIVERY SUMMARY - NSPROVIDERDELIVERYNOTE_OBGYN_ALL_OB_FT
Head delivered in WILLIAM. Nuchal x1 noted. Subsequently with gentle downward guidance, the anterior shoulder was delivered followed by the posterior shoulder and remainder of the body without difficulty.     Infant passed to the mother. Cord clamping was delayed for 1 minute. Cord clamped and cut. Cord gasses obtained via a segment of cord.     Placenta was delivered spontaneously intact. Uterine massage was performed and Oxytocin was given upon delivery of the placenta. Fundus noted to be firm. Bimanual performed and uterus was clear    Perineum intact. Vaginal walls, sulci, and cervix intact     Adequate hemostasis. EBL 200cc  Count correct x2. Head delivered in WILLIAM. Nuchal x1 loose, noted. Subsequently with gentle downward guidance, the anterior shoulder was delivered followed by the posterior shoulder and remainder of the body without difficulty.     Infant passed to the mother. Cord clamping was delayed for 1 minute. Cord clamped and cut. Cord gasses obtained via a segment of cord.     Placenta was delivered spontaneously intact. Uterine massage was performed and Oxytocin was given upon delivery of the placenta. Fundus noted to be firm. Bimanual performed and uterus was clear of any POC.    Perineum intact. Vaginal walls, sulci, and cervix intact     Adequate hemostasis. EBL 200cc  Count correct x2.    Attg Note:  I participated fully during the delivery and agree with above delivery note by Dr Eli Logan

## 2022-09-08 NOTE — OB PROVIDER H&P - NS ATTEND AMEND GEN_ALL_CORE FT
I personally saw and evaluated pt and agree with HARRY Nelson's assessment and plan.  Pt is a  at term who initially presented to L+D for elevated bps in office.    Her HELLP labs were normal; overall bps were 110s-120s/70s-80s with very few elevated bp 130s/90s.  Pt now reports more painful contractions and also made cervical change from 2 to 3 cm  will admit pt in early labor  Will call anesthesia team for epidural  Will consider pitocin as needed  expectant mgmt    RAKESH Logan

## 2022-09-08 NOTE — OB PROVIDER TRIAGE NOTE - HISTORY OF PRESENT ILLNESS
OB PA Triage Note    28 y/o  @39.0wks (FAMILIA 9/15) presents for evaluation of vaginal spotting. Pt states she went to the bathroom at 4:30am and noticed a small amount of blood in the toilet and on the toilet paper when wiping. Pt did not put on a pad or soak through her underwear with blood. She denies further vaginal bleeding. This morning when she woke up pt had a mild headache which has since resolved. She did not take anything for her headache. She was seen in the office and was sent in for evaluation and r/o PEC. Denies hx of elevated BPs in pregnancy, fevers, chills, changes in vision, nausea, vomiting, or RUQ pain. Reports irregular contractions that are not painful. Denies LOF. +FM. GBS positive. EFW 3200g.     PNC: Herpes outbreak 1 month ago with perianal lesions currently on Valtrex, lesions resolved.   ObHx: 2013: EMILIA, FT, 7#1   2016: TOP s/p D&C complicated by hemorrhage requiring 2u pRBC  2018: EMILIA, FT, 7#8  GynHx: HSV most recent outbreak 1 month ago, resolved, on Valtrex 500mg BID. 2019- HPV+ s/p colposcopy, repeat PAPs normal. Denies hx of fibroids, ovarian cysts.  MedHx: Panic attacks Denies hx of HTN, DM, asthma, thyroid problems, blood clots/bleeding problems.  Meds: PNV, Klonopin 0.25mg PRN (last used 2 weeks ago)  All: NKDA  PSHx: Denies  FHx: Denies hx of blood clots/bleeding problems  Social: Denies alcohol/tobacco/drug use in pregnancy  Psych: Denies hx of anxiety/depression

## 2022-09-08 NOTE — PRE-ANESTHESIA EVALUATION ADULT - NSANTHPMHFT_GEN_ALL_CORE
denies signif. PMH  s/p D & C (TOP)      2018  anxiety, hx/o panic attacks (approx. 1x/mo); clonipin PRN

## 2022-09-08 NOTE — OB PROVIDER H&P - NSHPPHYSICALEXAM_GEN_ALL_CORE
Vital Signs Last 24 Hrs  T(C): 37.1 (08 Sep 2022 11:06), Max: 37.1 (08 Sep 2022 10:55)  T(F): 98.8 (08 Sep 2022 11:06), Max: 98.8 (08 Sep 2022 11:06)  HR: 84 (08 Sep 2022 14:11) (45 - 99)  BP: 135/90 (08 Sep 2022 14:11) (116/75 - 139/95)  BP(mean): --  RR: 18 (08 Sep 2022 11:06) (18 - 18)  SpO2: 99% (08 Sep 2022 14:10) (96% - 100%)    Parameters below as of 08 Sep 2022 11:06  Patient On (Oxygen Delivery Method): room air    Gen: NAD  CV: NRRR  Lungs: CTA  Abd: soft, gravid, non-tender  SVE: 2.5/70/-2  EFM: 140bpm, moderate variability, +accels, -decels  [place]: Q4min  BSUS: vtx Vital Signs Last 24 Hrs  T(C): 37.1 (08 Sep 2022 11:06), Max: 37.1 (08 Sep 2022 10:55)  T(F): 98.8 (08 Sep 2022 11:06), Max: 98.8 (08 Sep 2022 11:06)  HR: 84 (08 Sep 2022 14:11) (45 - 99)  BP: 135/90 (08 Sep 2022 14:11) (116/75 - 139/95)  BP(mean): --  RR: 18 (08 Sep 2022 11:06) (18 - 18)  SpO2: 99% (08 Sep 2022 14:10) (96% - 100%)    Parameters below as of 08 Sep 2022 11:06  Patient On (Oxygen Delivery Method): room air    Gen: NAD  CV: NRRR  Lungs: CTA  Abd: soft, gravid, non-tender  SSE: no bleeding on exam, no lesions noted   SVE: 2.5/70/-2  EFM: 140bpm, moderate variability, +accels, -decels  [place]: Q4min  BSUS: vtx

## 2022-09-08 NOTE — OB PROVIDER TRIAGE NOTE - NSOBPROVIDERNOTE_OBGYN_ALL_OB_FT
A/P: 28 y/o  @39.0wks (FAMILIA 9/15) presents for vaginal spotting and headache.  - No VB on speculum exam  - No change in VE  - BPs wnl, headache resolved, HELLP labs pending  - EFM: reactive, continuous monitoring  - BPP pending  - Discussed with Dr. Ynes Nelson, PA-C

## 2022-09-08 NOTE — OB PROVIDER DELIVERY SUMMARY - NSSELHIDDEN_OBGYN_ALL_OB_FT
[NS_DeliveryAttending1_OBGYN_ALL_OB_FT:[id number]==],[NS_DeliveryAssist1_OBGYN_ALL_OB_FT:[id number]]

## 2022-09-08 NOTE — OB RN PATIENT PROFILE - FALL HARM RISK - UNIVERSAL INTERVENTIONS
Bed in lowest position, wheels locked, appropriate side rails in place/Call bell, personal items and telephone in reach/Instruct patient to call for assistance before getting out of bed or chair/Non-slip footwear when patient is out of bed/[place] to call system/Physically safe environment - no spills, clutter or unnecessary equipment/Purposeful Proactive Rounding/Room/bathroom lighting operational, light cord in reach

## 2022-09-08 NOTE — OB RN DELIVERY SUMMARY - NS_SEPSISRSKCALC_OBGYN_ALL_OB_FT
EOS calculated successfully. EOS Risk Factor: 0.5/1000 live births ([facility] national incidence); GA=39w;Temp=99; ROM=1.417; GBS='Positive'; Antibiotics='GBS specific antibiotics > 2 hrs prior to birth'

## 2022-09-08 NOTE — OB RN DELIVERY SUMMARY - NSSELHIDDEN_OBGYN_ALL_OB_FT
[NS_DeliveryAttending1_OBGYN_ALL_OB_FT:[id number]],[NS_DeliveryAssist1_OBGYN_ALL_OB_FT:[id number]=],[NS_DeliveryRN_OBGYN_ALL_OB_FT:[id number]=]

## 2022-09-08 NOTE — OB PROVIDER TRIAGE NOTE - NSHPPHYSICALEXAM_GEN_ALL_CORE
Vital Signs Last 24 Hrs  T(C): 37.1 (08 Sep 2022 11:06), Max: 37.1 (08 Sep 2022 10:55)  T(F): 98.8 (08 Sep 2022 11:06), Max: 98.8 (08 Sep 2022 11:06)  HR: 67 (08 Sep 2022 11:35) (45 - 99)  BP: 116/75 (08 Sep 2022 11:26) (116/75 - 126/85)  BP(mean): --  RR: 18 (08 Sep 2022 11:06) (18 - 18)  SpO2: 97% (08 Sep 2022 11:35) (97% - 99%)    Parameters below as of 08 Sep 2022 11:06  Patient On (Oxygen Delivery Method): room air    Gen: NAD  CV: NRRR  Lungs: CTA  Abd: soft, gravid, non-tender  SSE: no bleeding on speculum exam  SVE: 2/60/-3, unchanged, no blood on glove after VE  EFM: 155bpm, moderate variability, +accels, -decels  [place]: irregular   BPP: pending

## 2022-09-09 ENCOUNTER — TRANSCRIPTION ENCOUNTER (OUTPATIENT)
Age: 27
End: 2022-09-09

## 2022-09-09 LAB
HCT VFR BLD CALC: 30 % — LOW (ref 34.5–45)
HGB BLD-MCNC: 9.7 G/DL — LOW (ref 11.5–15.5)
T PALLIDUM AB TITR SER: NEGATIVE — SIGNIFICANT CHANGE UP

## 2022-09-09 RX ADMIN — OXYCODONE HYDROCHLORIDE 5 MILLIGRAM(S): 5 TABLET ORAL at 15:45

## 2022-09-09 RX ADMIN — Medication 0.5 MILLIGRAM(S): at 17:43

## 2022-09-09 RX ADMIN — OXYCODONE HYDROCHLORIDE 5 MILLIGRAM(S): 5 TABLET ORAL at 14:46

## 2022-09-09 RX ADMIN — Medication 600 MILLIGRAM(S): at 23:46

## 2022-09-09 RX ADMIN — Medication 600 MILLIGRAM(S): at 11:18

## 2022-09-09 RX ADMIN — Medication 975 MILLIGRAM(S): at 12:00

## 2022-09-09 RX ADMIN — Medication 1 TABLET(S): at 14:46

## 2022-09-09 RX ADMIN — Medication 600 MILLIGRAM(S): at 16:55

## 2022-09-09 RX ADMIN — Medication 600 MILLIGRAM(S): at 12:00

## 2022-09-09 RX ADMIN — OXYCODONE HYDROCHLORIDE 5 MILLIGRAM(S): 5 TABLET ORAL at 23:45

## 2022-09-09 RX ADMIN — Medication 975 MILLIGRAM(S): at 21:40

## 2022-09-09 RX ADMIN — Medication 600 MILLIGRAM(S): at 18:00

## 2022-09-09 RX ADMIN — Medication 975 MILLIGRAM(S): at 21:06

## 2022-09-09 RX ADMIN — Medication 975 MILLIGRAM(S): at 13:00

## 2022-09-09 NOTE — PROGRESS NOTE ADULT - ASSESSMENT
A/P:  27y  PPD # 1      S/P                 doing well      Current Issues: none  PAST MEDICAL & SURGICAL HISTORY:  Anxiety      HSV-2 infection      History of dilation and curettage
Vaginal bleeding

## 2022-09-10 VITALS
RESPIRATION RATE: 18 BRPM | SYSTOLIC BLOOD PRESSURE: 106 MMHG | DIASTOLIC BLOOD PRESSURE: 75 MMHG | HEART RATE: 91 BPM | OXYGEN SATURATION: 99 % | TEMPERATURE: 98 F

## 2022-09-10 LAB
MEV IGM SER-ACNC: <20 AU/ML — SIGNIFICANT CHANGE UP (ref 0–19.9)
RUBV IGG SER-ACNC: 1.2 INDEX — SIGNIFICANT CHANGE UP
RUBV IGG SER-IMP: POSITIVE — SIGNIFICANT CHANGE UP

## 2022-09-10 PROCEDURE — 83615 LACTATE (LD) (LDH) ENZYME: CPT

## 2022-09-10 PROCEDURE — 86762 RUBELLA ANTIBODY: CPT

## 2022-09-10 PROCEDURE — 86703 HIV-1/HIV-2 1 RESULT ANTBDY: CPT

## 2022-09-10 PROCEDURE — 59050 FETAL MONITOR W/REPORT: CPT

## 2022-09-10 PROCEDURE — 36415 COLL VENOUS BLD VENIPUNCTURE: CPT

## 2022-09-10 PROCEDURE — 86780 TREPONEMA PALLIDUM: CPT

## 2022-09-10 PROCEDURE — 86901 BLOOD TYPING SEROLOGIC RH(D): CPT

## 2022-09-10 PROCEDURE — 85730 THROMBOPLASTIN TIME PARTIAL: CPT

## 2022-09-10 PROCEDURE — 85025 COMPLETE CBC W/AUTO DIFF WBC: CPT

## 2022-09-10 PROCEDURE — 87635 SARS-COV-2 COVID-19 AMP PRB: CPT

## 2022-09-10 PROCEDURE — 85014 HEMATOCRIT: CPT

## 2022-09-10 PROCEDURE — 59025 FETAL NON-STRESS TEST: CPT

## 2022-09-10 PROCEDURE — G0463: CPT

## 2022-09-10 PROCEDURE — 86850 RBC ANTIBODY SCREEN: CPT

## 2022-09-10 PROCEDURE — 84550 ASSAY OF BLOOD/URIC ACID: CPT

## 2022-09-10 PROCEDURE — 85610 PROTHROMBIN TIME: CPT

## 2022-09-10 PROCEDURE — 81001 URINALYSIS AUTO W/SCOPE: CPT

## 2022-09-10 PROCEDURE — 85384 FIBRINOGEN ACTIVITY: CPT

## 2022-09-10 PROCEDURE — 86900 BLOOD TYPING SEROLOGIC ABO: CPT

## 2022-09-10 PROCEDURE — 82570 ASSAY OF URINE CREATININE: CPT

## 2022-09-10 PROCEDURE — 86769 SARS-COV-2 COVID-19 ANTIBODY: CPT

## 2022-09-10 PROCEDURE — 80053 COMPREHEN METABOLIC PANEL: CPT

## 2022-09-10 PROCEDURE — U0003: CPT

## 2022-09-10 PROCEDURE — 84156 ASSAY OF PROTEIN URINE: CPT

## 2022-09-10 PROCEDURE — 87340 HEPATITIS B SURFACE AG IA: CPT

## 2022-09-10 PROCEDURE — 85018 HEMOGLOBIN: CPT

## 2022-09-10 RX ORDER — AER TRAVELER 0.5 G/1
1 SOLUTION RECTAL; TOPICAL
Qty: 0 | Refills: 0 | DISCHARGE
Start: 2022-09-10

## 2022-09-10 RX ORDER — LANOLIN
1 OINTMENT (GRAM) TOPICAL
Qty: 0 | Refills: 0 | DISCHARGE
Start: 2022-09-10

## 2022-09-10 RX ORDER — IBUPROFEN 200 MG
1 TABLET ORAL
Qty: 0 | Refills: 0 | DISCHARGE
Start: 2022-09-10

## 2022-09-10 RX ADMIN — Medication 600 MILLIGRAM(S): at 12:45

## 2022-09-10 RX ADMIN — Medication 600 MILLIGRAM(S): at 00:20

## 2022-09-10 RX ADMIN — Medication 0.5 MILLIGRAM(S): at 06:19

## 2022-09-10 RX ADMIN — Medication 600 MILLIGRAM(S): at 06:50

## 2022-09-10 RX ADMIN — Medication 975 MILLIGRAM(S): at 10:00

## 2022-09-10 RX ADMIN — Medication 600 MILLIGRAM(S): at 12:15

## 2022-09-10 RX ADMIN — Medication 600 MILLIGRAM(S): at 06:19

## 2022-09-10 RX ADMIN — Medication 975 MILLIGRAM(S): at 09:05

## 2022-09-10 RX ADMIN — Medication 975 MILLIGRAM(S): at 02:44

## 2022-09-10 NOTE — DISCHARGE NOTE OB - MEDICATION SUMMARY - MEDICATIONS TO STOP TAKING
I will STOP taking the medications listed below when I get home from the hospital:    ibuprofen 600 mg oral tablet  -- 1 tab(s) by mouth every 6 hours    ofloxacin 0.3% otic solution  -- 4 drop(s) in the right ear 2 times a day   -- For the ear.

## 2022-09-10 NOTE — DISCHARGE NOTE OB - NS MD DC FALL RISK RISK
For information on Fall & Injury Prevention, visit: [email or web address] OR  [email or web address] OR  https://www.cdc.gov/steadi/patient.html

## 2022-09-10 NOTE — DISCHARGE NOTE OB - MATERIALS PROVIDED
Bottle Feeding Log/Guide to Postpartum Care/[facility] Hearing Screen Program/Back To Sleep Handout/Shaken Baby Prevention Handout/Birth Certificate Instructions

## 2022-09-10 NOTE — DISCHARGE NOTE OB - CARE PROVIDER_API CALL
Merline Celis)  Obstetrics and Gynecology  [address] [facility], [place] [address]  Phone: [phone]  Fax: [phone]  Follow Up Time:

## 2022-09-10 NOTE — DISCHARGE NOTE OB - CARE PLAN
1 Principal Discharge DX:	Vaginal delivery  Assessment and plan of treatment:	activity as tolerated and outlined below  regular diet  outpatient delivery

## 2022-09-10 NOTE — PROGRESS NOTE ADULT - SUBJECTIVE AND OBJECTIVE BOX
Postpartum Note- PPD#1    Allergies    No Known Allergies    Intolerances      Blood Type O   Positive    RPR : Negative    S: Patient is a  27y            PPD#1         S/P    Patient w/o complaints, pain is controlled.    Pt is OOB, tolerating PO, passing flatus. Lochia WNL.     Feeding: Bottle    O:  Vital Signs Last 24 Hrs  T(C): 36.5 (09 Sep 2022 05:22), Max: 37.2 (08 Sep 2022 14:56)  T(F): 97.7 (09 Sep 2022 05:22), Max: 99 (08 Sep 2022 14:56)  HR: 95 (09 Sep 2022 05:22) (45 - 109)  BP: 123/81 (09 Sep 2022 05:22) (108/65 - 143/80)  BP(mean): 94 (08 Sep 2022 22:30) (90 - 104)  RR: 18 (09 Sep 2022 05:22) (16 - 18)  SpO2: 100% (09 Sep 2022 05:22) (86% - 100%)      Gen: NAD  Abdomen: Soft, nontender, non-distended, fundus firm.  Vaginal: Lochia WNL  Ext: Neg calf tenderness    LABS:    Hemoglobin: 9.7 g/dL ( @ 06:20)  Hemoglobin: 10.7 g/dL ( @ 18:12)  Hemoglobin: 11.6 g/dL ( @ 13:00)      Hematocrit: 30.0 % ( @ 06:20)  Hematocrit: 33.0 % ( @ 18:12)  Hematocrit: 35.7 % ( @ 13:00)              
S: Patient doing well. Minimal lochia. Pain controlled.       to uro for hyperspadias.  O: Vital Signs Last 24 Hrs  T(C): 36.9 (10 Sep 2022 09:21), Max: 36.9 (10 Sep 2022 09:21)  T(F): 98.4 (10 Sep 2022 09:21), Max: 98.4 (10 Sep 2022 09:21)  HR: 91 (10 Sep 2022 09:) (57 - 91)  BP: 106/75 (10 Sep 2022 09:21) (106/75 - 128/82)  BP(mean): --  RR: 18 (10 Sep 2022 09:21) (17 - 18)  SpO2: 99% (10 Sep 2022 09:21) (97% - 99%)    Parameters below as of 10 Sep 2022 05:12  Patient On (Oxygen Delivery Method): room air        Gen: NAD  Breast- filling.  Abd: soft, NT, ND, fundus firm below umbilicus  Lochia: moderate  Ext: no tenderness  Psy- alert, upbeat,   Labs:                        9.7    x     )-----------( x        ( 09 Sep 2022 06:20 )             30.0       A: 27y PPD#2 s/p  doing well.    Plan: D/C planning.  patient will have a refill for klonopin for panic attacks.

## 2022-09-10 NOTE — DISCHARGE NOTE OB - MEDICATION SUMMARY - MEDICATIONS TO TAKE
I will START or STAY ON the medications listed below when I get home from the hospital:    ibuprofen 600 mg oral tablet  -- 1 tab(s) by mouth every 6 hours  -- Indication: For pain    ibuprofen 600 mg oral tablet  -- 1 tab(s) by mouth every 6 hours  -- Indication: For pain    lanolin topical ointment  -- 1 application on skin every 6 hours, As needed, nipple soreness  -- Indication: For nipple soreness    witch hazel 50% topical pad  -- 1 application on skin every 4 hours, As needed, Perineal discomfort  -- Indication: For perineal soreness    Prenatal Multivitamins with Folic Acid 1 mg oral tablet  -- 1 tab(s) by mouth once a day  -- Indication: For postpartum

## 2022-09-15 ENCOUNTER — APPOINTMENT (OUTPATIENT)
Dept: ANTEPARTUM | Facility: CLINIC | Age: 27
End: 2022-09-15

## 2022-12-21 NOTE — OB RN PATIENT PROFILE - NS_ARRIVALFROM_OBGYN_ALL_OB
How Severe Is Your Skin Lesion?: mild Has Your Skin Lesion Been Treated?: not been treated Is This A New Presentation, Or A Follow-Up?: [facility]

## 2023-01-01 NOTE — PATIENT PROFILE OB - BREAST MILK PROVIDES PROTECTION AGAINST INFECTION
Report handed over to L/d RANDAL Ron to another room for delivery at this time    JAMES Neri RN  
Statement Selected

## 2023-08-14 NOTE — ED PROVIDER NOTE - PATIENT PORTAL LINK FT
Left message to call back     ECO please send this call to P 'provider' Nurse Msg Pool  Send WARM call to [phone]      You can access the FollowMyHealth Patient Portal offered by [facility] by registering at the following website: [email or web address]. By joining [employer]’s FollowMyHealth portal, you will also be able to view your health information using other applications (apps) compatible with our system.

## 2023-08-21 ENCOUNTER — RX ONLY (RX ONLY)
Age: 28
End: 2023-08-21

## 2023-08-21 ENCOUNTER — OFFICE (OUTPATIENT)
Dept: URBAN - METROPOLITAN AREA CLINIC 90 | Facility: CLINIC | Age: 28
Setting detail: OPHTHALMOLOGY
End: 2023-08-21
Payer: COMMERCIAL

## 2023-08-21 DIAGNOSIS — H43.393: ICD-10-CM

## 2023-08-21 DIAGNOSIS — H57.02: ICD-10-CM

## 2023-08-21 PROCEDURE — 92004 COMPRE OPH EXAM NEW PT 1/>: CPT | Performed by: OPHTHALMOLOGY

## 2023-08-21 ASSESSMENT — REFRACTION_AUTOREFRACTION
OS_AXIS: 179
OS_SPHERE: -10.50
OS_CYLINDER: -0.75
OD_SPHERE: -11.75
OD_CYLINDER: -1.00
OD_AXIS: 178

## 2023-08-21 ASSESSMENT — KERATOMETRY
OD_AXISANGLE_DEGREES: 083
OS_AXISANGLE_DEGREES: 087
OD_K2POWER_DIOPTERS: 45.75
OS_K1POWER_DIOPTERS: 44.50
OD_K1POWER_DIOPTERS: 44.50
OS_K2POWER_DIOPTERS: 45.25

## 2023-08-21 ASSESSMENT — TONOMETRY
OD_IOP_MMHG: 15
OS_IOP_MMHG: 13

## 2023-08-21 ASSESSMENT — REFRACTION_CURRENTRX
OS_OVR_VA: 20/
OS_CYLINDER: SPH
OD_AXIS: 027
OD_SPHERE: -11.25
OD_CYLINDER: -0.25
OS_VPRISM_DIRECTION: SV
OS_SPHERE: -10.25
OD_VPRISM_DIRECTION: SV
OD_OVR_VA: 20/

## 2023-08-21 ASSESSMENT — CONFRONTATIONAL VISUAL FIELD TEST (CVF)
OS_FINDINGS: FULL
OD_FINDINGS: FULL

## 2023-08-21 ASSESSMENT — SPHEQUIV_DERIVED
OD_SPHEQUIV: -12.25
OS_SPHEQUIV: -10.875

## 2023-08-21 ASSESSMENT — VISUAL ACUITY
OD_BCVA: 20/20
OS_BCVA: 20/20

## 2023-08-21 ASSESSMENT — AXIALLENGTH_DERIVED
OS_AL: 28.0251
OD_AL: 28.6658

## 2023-12-11 ENCOUNTER — OFFICE (OUTPATIENT)
Dept: URBAN - METROPOLITAN AREA CLINIC 27 | Facility: CLINIC | Age: 28
Setting detail: OPHTHALMOLOGY
End: 2023-12-11
Payer: COMMERCIAL

## 2023-12-11 DIAGNOSIS — H43.393: ICD-10-CM

## 2023-12-11 DIAGNOSIS — H16.423: ICD-10-CM

## 2023-12-11 PROBLEM — H16.223 DRY EYE SYNDROME K SICCA; BOTH EYES: Status: ACTIVE | Noted: 2023-12-11

## 2023-12-11 PROCEDURE — 92012 INTRM OPH EXAM EST PATIENT: CPT | Performed by: OPHTHALMOLOGY

## 2023-12-11 ASSESSMENT — REFRACTION_CURRENTRX
OD_CYLINDER: SPH
OD_VPRISM_DIRECTION: SV
OS_OVR_VA: 20/
OS_VPRISM_DIRECTION: SV
OD_SPHERE: -11.25
OD_OVR_VA: 20/
OD_AXIS: 027
OS_SPHERE: -10.50
OS_AXIS: 41
OS_CYLINDER: +0.25

## 2023-12-11 ASSESSMENT — SPHEQUIV_DERIVED
OS_SPHEQUIV: -10.75
OD_SPHEQUIV: -11.75

## 2023-12-11 ASSESSMENT — SUPERFICIAL PUNCTATE KERATITIS (SPK)
OS_SPK: 1+
OD_SPK: 1+

## 2023-12-11 ASSESSMENT — REFRACTION_AUTOREFRACTION
OS_CYLINDER: +0.50
OD_SPHERE: -12.25
OS_SPHERE: -11.00
OD_AXIS: 90
OD_CYLINDER: +1.00
OS_AXIS: 88

## 2023-12-11 ASSESSMENT — CONFRONTATIONAL VISUAL FIELD TEST (CVF)
OD_FINDINGS: FULL
OS_FINDINGS: FULL

## 2023-12-11 ASSESSMENT — VASCULARIZATION
OS_VASCULARIZATION: PANNUS
OD_VASCULARIZATION: PANNUS

## 2023-12-11 ASSESSMENT — DRY EYES - PHYSICIAN NOTES
OD_GENERALCOMMENTS: INFERIOR
OS_GENERALCOMMENTS: INFERIOR

## 2024-01-01 NOTE — H&P ADULT - NSHPLABSRESULTS_GEN_ALL_CORE
NOTE    Patient name: Cookie Cohen  MRN: [id number]  Mother:  Eva Cohen    Gestational Age: 39w0d female now 39w 0d on DOL# 0 days    Delivery Clinician:  PHYLLIS JANG/FP: RY Sharpe (Eder Tavares)    PRENATAL / BIRTH HISTORY / DELIVERY   ROM on 2024 at 8:18 AM; Normal  x 0h 00m  (prior to delivery).  Infant delivered on 2024 at 8:18 AM    Gestational Age: 39w0d female born by , Low Transverse (repeat) to a 32 y.o.   . Cord Information: 3 vessels; Complications: None. Prenatal ultrasounds Normal anatomy per OB note. Pregnancy and/or labor complicated by no known issues. Mother received PNV and cefazolin during pregnancy and/or labor. Resuscitation at delivery: Tactile Stimulation;Warmed via Radiant Warmer ;Dried . Apgars: 8  and 9 .    Maternal Prenatal Labs:    ABO Type   Date Value Ref Range Status   2024 O  Final     RH type   Date Value Ref Range Status   2024 Positive  Final     Antibody Screen   Date Value Ref Range Status   2024 Negative  Final     Neisseria gonorrhoeae, EDIE   Date Value Ref Range Status   2023 Negative  Final     Chlamydia trachomatis, EDIE   Date Value Ref Range Status   2023 Negative  Final     External RPR   Date Value Ref Range Status   2023 Non-Reactive  Final     Treponemal AB Total   Date Value Ref Range Status   2024 Non-Reactive Non-Reactive Final     Rubella Antibodies, IgG   Date Value Ref Range Status   2023 1.93  Final      External Hepatitis B Surface Ag   Date Value Ref Range Status   2023 Negative  Final     HIV Screen 4th Gen w/RFX (Reference)   Date Value Ref Range Status   2023 Non-Reactive  Final     Strep Gp B Culture   Date Value Ref Range Status   2024 Negative Negative Final     Comment:     Centers for Disease Control and Prevention (CDC) and American Congress  of Obstetricians and Gynecologists  "(ACOG) guidelines for prevention of   group B streptococcal (GBS) disease specify co-collection of  a vaginal and rectal swab specimen to maximize sensitivity of GBS  detection. Per the CDC and ACOG, swabbing both the lower vagina and  rectum substantially increases the yield of detection compared with  sampling the vagina alone.  Penicillin G, ampicillin, or cefazolin are indicated for intrapartum  prophylaxis of  GBS colonization. Reflex susceptibility  testing should be performed prior to use of clindamycin only on GBS  isolates from penicillin-allergic women who are considered a high risk  for anaphylaxis. Treatment with vancomycin without additional testing  is warranted if resistance to clindamycin is noted.         Per [facility] documentation (in media tab): Initial visit 23 (at 14w2d), maternal UDS NEGATIVE, all labs negative except Hepatitis C NOT obtained (to be obtained on this admission)     VITAL SIGNS & PHYSICAL EXAM:   Birth Wt: 7 lb 14.6 oz (3590 g) T: 98.3 °F (36.8 °C) (Axillary)  HR: 140   RR: 40        Current Weight:    Weight: 3590 g (7 lb 14.6 oz) (Filed from Delivery Summary)    Birth Length: 21       Change in weight since birth: 0% Birth Head circumference: Head Circumference: 35 cm (13.78\")                  NORMAL  EXAMINATION    UNLESS OTHERWISE NOTED EXCEPTIONS    (AS NOTED)   General/Neuro   In no apparent distress, appears c/w EGA  Exam/reflexes appropriate for age and gestation None   Skin   Clear w/o abnormal rash, jaundice or lesions  Normal perfusion and peripheral pulses + adrienne   HEENT   Normocephalic w/ nl sutures, eyes open.  RR:red reflex present bilaterally, conjunctiva without erythema, no drainage, sclera white, and no edema  ENT patent w/o obvious defects None   Chest   In no apparent respiratory distress  CTA / RRR. No Murmur None   Abdomen/Genitalia   Soft, nondistended w/o organomegaly  Normal appearance for gender and gestation  normal " female   Trunk  Spine  Extremities Straight w/o obvious defects  Active, mobile without deformity None     INTAKE AND OUTPUT     Feeding: Plans to breastfeed     Intake & Output (last day)       None          LABS     Infant Blood Type: A+  KEIRA: Negative  Passive AB: N/A    Recent Results (from the past 24 hour(s))   Cord Blood Evaluation    Collection Time: 24  8:29 AM    Specimen: Umbilical Cord; Cord Blood   Result Value Ref Range    ABO Type A     RH type Positive     KEIRA IgG Negative            TESTING      BP:   Location: Right Arm  pending    Location: Right Leg         CCHD     Car Seat Challenge Test     Hearing Screen       Screen       There is no immunization history for the selected administration types on file for this patient.    Infant did NOT receive RSV antibody while inpatient.    As indicated in active problem list and/or as listed as below. The plan of care has been / will be discussed with the family/primary caregiver(s).    RECOGNIZED PROBLEMS & IMMEDIATE PLAN(S) OF CARE:     Patient Active Problem List    Diagnosis Date Noted    *Single liveborn, born in hospital, delivered by  section 2024     Note Last Updated: 2024     ------------------------------------------------------------------------------       FOLLOW UP:     Check/ follow up:  Maternal Hepatitis C result- documentation to be obtained of negative Hep C Ab during pregnancy OR to be obtained during this admission (discussed with RN)    Other Issues: GBS Plan: GBS negative, infant clinically well on exam, routine  care.    DARIEL Savage  [facility] -  Nursery  [facility]  Documentation reviewed and electronically signed on 2024 at 11:59 EST     DISCLAIMER:      “As of 2021, as required by the Federal 21st Century Cures Act, medical records (including provider notes and laboratory/imaging results) are to be made available to  patients and/or their designees as soon as the documents are signed/resulted. While the intention is to ensure transparency and to engage patients in their healthcare, this immediate access may create unintended consequences because this document uses language intended for communication between medical providers for interpretation with the entirety of the patient’s clinical picture in mind. It is recommended that patients and/or their designees review all available information with their primary or specialist providers for explanation and to avoid misinterpretation of this information.”  Attending Physician Addendum:    I have reviewed this patient's active problem list and corresponding treatment plan, while providing supervision of the management of this patient by the Advanced Practice Provider. This patient's pertinent monitoring, laboratory and/or radiological data were reviewed. To the best of my knowledge, the documentation represents an accurate description of this patient's current status, with any exceptions noted below.  Continue  care    Hever Beatty MD  Attending Neonatologist  [facility] - Neonatology  Documentation reviewed and electronically signed on 2024 at 11:54 EST    TVUS: Retained products of conception    LABS:                        6.6    7.1   )-----------( 276      ( 2017 22:52 )             19.4     07-13    138  |  105  |  10  ----------------------------<  112<H>  4.4   |  20<L>  |  0.51    Ca    8.5      2017 22:52    TPro  6.2  /  Alb  4.0  /  TBili  0.4  /  DBili  x   /  AST  23  /  ALT  10  /  AlkPhos  34<L>        Urinalysis Basic - ( 2017 01:13 )    Color: Red / Appearance: Turbid / S.029 / pH: x  Gluc: x / Ketone: Trace  / Bili: Negative / Urobili: Negative   Blood: x / Protein: >600 mg/dL / Nitrite: Negative   Leuk Esterase: Small / RBC: >50 /HPF / WBC 5-10 /HPF   Sq Epi: x / Non Sq Epi: x / Bacteria: x        Blood Type: O Positive

## 2024-04-18 NOTE — PROGRESS NOTE ADULT - PROBLEM SELECTOR PLAN 3
"[facility] [[id number]]  No chief complaint on file.    Initial BP (!) 87/62   Pulse 85   Temp 96  F (35.6  C)   Resp 24   Ht 4' 5.54\" (136 cm)   Wt 106 lb 14.8 oz (48.5 kg)   SpO2 93%   BMI 26.22 kg/m   Estimated body mass index is 26.22 kg/m  as calculated from the following:    Height as of this encounter: 4' 5.54\" (136 cm).    Weight as of this encounter: 106 lb 14.8 oz (48.5 kg).  Medication Reconciliation: complete  Sobeida Camara LPN          " No need to cont. prophylactic Valtrex

## 2024-07-30 NOTE — PATIENT PROFILE OB - BREAST MILK IS MORE DIGESTIBLE, MAKING VOMITING, DIARRHEA, GAS AND CONSTIPATION LESS COMMON
Prior to immunization administration, verified patients identity using patient s name and date of birth. Please see Immunization Activity for additional information.     Screening Questionnaire for Adult Immunization    Are you sick today?   No   Do you have allergies to medications, food, a vaccine component or latex?   No   Have you ever had a serious reaction after receiving a vaccination?   No   Do you have a long-term health problem with heart, lung, kidney, or metabolic disease (e.g., diabetes), asthma, a blood disorder, no spleen, complement component deficiency, a cochlear implant, or a spinal fluid leak?  Are you on long-term aspirin therapy?   No   Do you have cancer, leukemia, HIV/AIDS, or any other immune system problem?   No   Do you have a parent, brother, or sister with an immune system problem?   No   In the past 3 months, have you taken medications that affect  your immune system, such as prednisone, other steroids, or anticancer drugs; drugs for the treatment of rheumatoid arthritis, Crohn s disease, or psoriasis; or have you had radiation treatments?   No   Have you had a seizure, or a brain or other nervous system problem?   No   During the past year, have you received a transfusion of blood or blood    products, or been given immune (gamma) globulin or antiviral drug?   No   For women: Are you pregnant or is there a chance you could become       pregnant during the next month?   No   Have you received any vaccinations in the past 4 weeks?   No     Immunization questionnaire answers were all negative.      Patient instructed to remain in clinic for 15 minutes afterwards, and to report any adverse reactions.     Screening performed by Gisell Butt MA on 7/30/2024 at 10:05 AM.        Statement Selected

## 2025-07-02 ENCOUNTER — NON-APPOINTMENT (OUTPATIENT)
Age: 30
End: 2025-07-02
